# Patient Record
Sex: MALE | Race: WHITE | NOT HISPANIC OR LATINO | Employment: OTHER | URBAN - NONMETROPOLITAN AREA
[De-identification: names, ages, dates, MRNs, and addresses within clinical notes are randomized per-mention and may not be internally consistent; named-entity substitution may affect disease eponyms.]

---

## 2023-03-27 DIAGNOSIS — E78.5 HYPERLIPIDEMIA, UNSPECIFIED HYPERLIPIDEMIA TYPE: Primary | ICD-10-CM

## 2023-03-27 DIAGNOSIS — I10 ESSENTIAL (PRIMARY) HYPERTENSION: ICD-10-CM

## 2023-03-27 RX ORDER — HYDROCHLOROTHIAZIDE 12.5 MG/1
TABLET ORAL
Qty: 90 TABLET | Refills: 3 | Status: ON HOLD | OUTPATIENT
Start: 2023-03-27 | End: 2024-02-08 | Stop reason: SDUPTHER

## 2023-03-27 RX ORDER — ATORVASTATIN CALCIUM 80 MG/1
TABLET, FILM COATED ORAL
Qty: 45 TABLET | Refills: 3 | Status: SHIPPED | OUTPATIENT
Start: 2023-03-27 | End: 2024-05-16 | Stop reason: SDUPTHER

## 2023-03-27 RX ORDER — AMLODIPINE BESYLATE 10 MG/1
10 TABLET ORAL DAILY
COMMUNITY
End: 2023-03-27 | Stop reason: SDUPTHER

## 2023-03-27 RX ORDER — HYDROCHLOROTHIAZIDE 12.5 MG/1
12.5 TABLET ORAL DAILY
COMMUNITY
End: 2023-03-27 | Stop reason: SDUPTHER

## 2023-03-27 RX ORDER — ATORVASTATIN CALCIUM 80 MG/1
80 TABLET, FILM COATED ORAL DAILY
COMMUNITY
End: 2023-03-27 | Stop reason: SDUPTHER

## 2023-03-27 RX ORDER — AMLODIPINE BESYLATE 10 MG/1
TABLET ORAL
Qty: 90 TABLET | Refills: 3 | Status: SHIPPED | OUTPATIENT
Start: 2023-03-27 | End: 2024-04-01

## 2023-04-12 ENCOUNTER — TELEPHONE (OUTPATIENT)
Dept: PRIMARY CARE | Facility: CLINIC | Age: 73
End: 2023-04-12
Payer: MEDICARE

## 2023-04-12 NOTE — TELEPHONE ENCOUNTER
Patient came in wanting a refill on sildenafil Citrate 50 mg  sent to the [facility] on [facility]. Please call when it is sent to the pharmacy.

## 2023-04-13 DIAGNOSIS — N52.9 VASCULOGENIC ERECTILE DYSFUNCTION, UNSPECIFIED VASCULOGENIC ERECTILE DYSFUNCTION TYPE: Primary | ICD-10-CM

## 2023-04-13 RX ORDER — SILDENAFIL 50 MG/1
TABLET, FILM COATED ORAL
Qty: 30 TABLET | Refills: 1 | Status: SHIPPED | OUTPATIENT
Start: 2023-04-13

## 2023-05-05 PROBLEM — R25.9 ABNORMAL INVOLUNTARY MOVEMENT: Status: ACTIVE | Noted: 2023-05-05

## 2023-05-05 PROBLEM — R73.9 ELEVATED BLOOD SUGAR LEVEL: Status: ACTIVE | Noted: 2023-05-05

## 2023-05-05 PROBLEM — I71.019: Status: ACTIVE | Noted: 2023-05-05

## 2023-05-05 PROBLEM — I73.9 CLAUDICATION OF BOTH LOWER EXTREMITIES (CMS-HCC): Status: ACTIVE | Noted: 2023-05-05

## 2023-05-05 PROBLEM — H93.19 TINNITUS: Status: ACTIVE | Noted: 2023-05-05

## 2023-05-05 PROBLEM — M10.9 GOUT: Status: ACTIVE | Noted: 2023-05-05

## 2023-05-05 PROBLEM — T84.84XA: Status: ACTIVE | Noted: 2023-05-05

## 2023-05-05 PROBLEM — G47.31 CENTRAL SLEEP APNEA: Status: ACTIVE | Noted: 2023-05-05

## 2023-05-05 PROBLEM — Z96.619: Status: ACTIVE | Noted: 2023-05-05

## 2023-05-05 PROBLEM — M25.511 RIGHT SHOULDER PAIN: Status: ACTIVE | Noted: 2023-05-05

## 2023-05-05 PROBLEM — D69.6 THROMBOCYTOPENIA (CMS-HCC): Status: ACTIVE | Noted: 2023-05-05

## 2023-05-05 PROBLEM — R25.1 TREMOR OF BOTH HANDS: Status: ACTIVE | Noted: 2023-05-05

## 2023-05-05 PROBLEM — B94.8 SEQUELAE OF OTHER SPECIFIED INFECTIOUS AND PARASITIC DISEASES: Status: ACTIVE | Noted: 2023-05-05

## 2023-05-05 PROBLEM — H91.90 HEARING LOSS: Status: ACTIVE | Noted: 2023-05-05

## 2023-05-05 PROBLEM — R31.29 OTHER MICROSCOPIC HEMATURIA: Status: ACTIVE | Noted: 2023-05-05

## 2023-05-05 PROBLEM — E29.1 HYPOGONADISM, MALE: Status: ACTIVE | Noted: 2023-05-05

## 2023-05-05 PROBLEM — I65.29 CAROTID ARTERY STENOSIS: Status: ACTIVE | Noted: 2023-05-05

## 2023-05-05 PROBLEM — M20.5X1 HALLUX LIMITUS OF RIGHT FOOT: Status: ACTIVE | Noted: 2023-05-05

## 2023-05-05 PROBLEM — G31.84 MILD COGNITIVE IMPAIRMENT: Status: ACTIVE | Noted: 2023-05-05

## 2023-05-05 PROBLEM — N52.9 ERECTILE DYSFUNCTION: Status: ACTIVE | Noted: 2023-05-05

## 2023-05-05 PROBLEM — E55.9 MILD VITAMIN D DEFICIENCY: Status: ACTIVE | Noted: 2023-05-05

## 2023-05-05 PROBLEM — L71.9 ROSACEA: Status: ACTIVE | Noted: 2023-05-05

## 2023-05-05 PROBLEM — M62.08 RECTUS DIASTASIS: Status: ACTIVE | Noted: 2023-05-05

## 2023-05-05 PROBLEM — N28.9 RENAL INSUFFICIENCY: Status: ACTIVE | Noted: 2023-05-05

## 2023-05-05 PROBLEM — R41.89 COGNITIVE CHANGE: Status: ACTIVE | Noted: 2023-05-05

## 2023-05-05 PROBLEM — I20.9 ANGINA PECTORIS (CMS-HCC): Status: ACTIVE | Noted: 2023-05-05

## 2023-05-05 PROBLEM — K21.9 ESOPHAGEAL REFLUX: Status: ACTIVE | Noted: 2023-05-05

## 2023-05-05 PROBLEM — R26.89 ANTALGIC GAIT: Status: ACTIVE | Noted: 2023-05-05

## 2023-05-05 PROBLEM — M54.50 LOW BACK PAIN: Status: ACTIVE | Noted: 2023-05-05

## 2023-05-05 PROBLEM — N02.9 BENIGN ESSENTIAL HEMATURIA: Status: ACTIVE | Noted: 2023-05-05

## 2023-05-05 RX ORDER — MULTIVIT-MIN/IRON/FOLIC ACID/K 18-600-40
CAPSULE ORAL
COMMUNITY

## 2023-05-05 RX ORDER — LANOLIN ALCOHOL/MO/W.PET/CERES
CREAM (GRAM) TOPICAL EVERY OTHER DAY
COMMUNITY

## 2023-05-05 RX ORDER — CITALOPRAM 10 MG/1
10 TABLET ORAL EVERY EVENING
COMMUNITY
Start: 2022-12-01 | End: 2024-01-24 | Stop reason: WASHOUT

## 2023-05-05 RX ORDER — EPINEPHRINE 0.22MG
AEROSOL WITH ADAPTER (ML) INHALATION
COMMUNITY

## 2023-05-05 RX ORDER — CHOLECALCIFEROL (VITAMIN D3) 125 MCG
CAPSULE ORAL
COMMUNITY

## 2023-05-05 RX ORDER — CARVEDILOL 6.25 MG/1
6.25 TABLET ORAL 2 TIMES DAILY
COMMUNITY
End: 2024-05-30 | Stop reason: SDUPTHER

## 2023-05-05 RX ORDER — VALSARTAN 320 MG/1
1 TABLET ORAL DAILY
COMMUNITY
Start: 2020-12-29 | End: 2023-10-12

## 2023-05-05 RX ORDER — ALLOPURINOL 300 MG/1
TABLET ORAL
COMMUNITY
End: 2023-07-05

## 2023-05-09 ENCOUNTER — LAB (OUTPATIENT)
Dept: LAB | Facility: LAB | Age: 73
End: 2023-05-09
Payer: MEDICARE

## 2023-05-09 ENCOUNTER — OFFICE VISIT (OUTPATIENT)
Dept: PRIMARY CARE | Facility: CLINIC | Age: 73
End: 2023-05-09
Payer: MEDICARE

## 2023-05-09 VITALS
BODY MASS INDEX: 37.32 KG/M2 | WEIGHT: 237.8 LBS | HEART RATE: 59 BPM | RESPIRATION RATE: 14 BRPM | TEMPERATURE: 97.7 F | HEIGHT: 67 IN | DIASTOLIC BLOOD PRESSURE: 68 MMHG | SYSTOLIC BLOOD PRESSURE: 122 MMHG | OXYGEN SATURATION: 93 %

## 2023-05-09 DIAGNOSIS — J06.9 UPPER RESPIRATORY TRACT INFECTION, UNSPECIFIED TYPE: ICD-10-CM

## 2023-05-09 DIAGNOSIS — I73.9 CLAUDICATION OF BOTH LOWER EXTREMITIES (CMS-HCC): ICD-10-CM

## 2023-05-09 DIAGNOSIS — I65.29 STENOSIS OF CAROTID ARTERY, UNSPECIFIED LATERALITY: ICD-10-CM

## 2023-05-09 DIAGNOSIS — I71.019 CHRONIC THORACIC AORTIC DISSECTION (MULTI): ICD-10-CM

## 2023-05-09 DIAGNOSIS — E78.2 MIXED HYPERLIPIDEMIA: ICD-10-CM

## 2023-05-09 DIAGNOSIS — I10 PRIMARY HYPERTENSION: ICD-10-CM

## 2023-05-09 DIAGNOSIS — H93.13 TINNITUS OF BOTH EARS: ICD-10-CM

## 2023-05-09 DIAGNOSIS — E78.2 MIXED HYPERLIPIDEMIA: Primary | ICD-10-CM

## 2023-05-09 DIAGNOSIS — Z12.5 PROSTATE CANCER SCREENING: ICD-10-CM

## 2023-05-09 PROCEDURE — 85025 COMPLETE CBC W/AUTO DIFF WBC: CPT

## 2023-05-09 PROCEDURE — 1157F ADVNC CARE PLAN IN RCRD: CPT | Performed by: FAMILY MEDICINE

## 2023-05-09 PROCEDURE — 3074F SYST BP LT 130 MM HG: CPT | Performed by: FAMILY MEDICINE

## 2023-05-09 PROCEDURE — 81003 URINALYSIS AUTO W/O SCOPE: CPT

## 2023-05-09 PROCEDURE — 1160F RVW MEDS BY RX/DR IN RCRD: CPT | Performed by: FAMILY MEDICINE

## 2023-05-09 PROCEDURE — 3078F DIAST BP <80 MM HG: CPT | Performed by: FAMILY MEDICINE

## 2023-05-09 PROCEDURE — 80061 LIPID PANEL: CPT

## 2023-05-09 PROCEDURE — 80053 COMPREHEN METABOLIC PANEL: CPT

## 2023-05-09 PROCEDURE — 1036F TOBACCO NON-USER: CPT | Performed by: FAMILY MEDICINE

## 2023-05-09 PROCEDURE — 36415 COLL VENOUS BLD VENIPUNCTURE: CPT

## 2023-05-09 PROCEDURE — 1159F MED LIST DOCD IN RCRD: CPT | Performed by: FAMILY MEDICINE

## 2023-05-09 PROCEDURE — 84153 ASSAY OF PSA TOTAL: CPT

## 2023-05-09 PROCEDURE — 84443 ASSAY THYROID STIM HORMONE: CPT

## 2023-05-09 PROCEDURE — 99215 OFFICE O/P EST HI 40 MIN: CPT | Performed by: FAMILY MEDICINE

## 2023-05-09 RX ORDER — BENZONATATE 200 MG/1
200 CAPSULE ORAL 3 TIMES DAILY PRN
Qty: 30 CAPSULE | Refills: 0 | Status: SHIPPED | OUTPATIENT
Start: 2023-05-09 | End: 2023-06-08

## 2023-05-09 ASSESSMENT — PAIN SCALES - GENERAL: PAINLEVEL: 0-NO PAIN

## 2023-05-09 ASSESSMENT — PATIENT HEALTH QUESTIONNAIRE - PHQ9
2. FEELING DOWN, DEPRESSED OR HOPELESS: NOT AT ALL
SUM OF ALL RESPONSES TO PHQ9 QUESTIONS 1 AND 2: 0
1. LITTLE INTEREST OR PLEASURE IN DOING THINGS: NOT AT ALL

## 2023-05-09 NOTE — PROGRESS NOTES
"Subjective   Patient ID: Cam Hernandez is a 73 y.o. male who presents for Hyperlipidemia, Hypertension, and Cough (Getting over bronchitis ).    HPI   Loc was seen today for a routine follow-up of his cholesterol, hypertension, vascular medications.  Medication(s) are being taken and tolerated as prescribed, without concerns, list reconciled today.  There are no complaints of chest pain, shortness of breath, lower extremity edema, or exertional concerns    Previous labs reviewed, he is fasting today, due for a PSA.  Vascular care is up-to-date, sees Dr. Dawson this summer, for his vascular disease, history of carotid, thoracic aorta, peripheral vascular disease.  Cam had difficulty losing weight, knows that he has to improve his diet, is also considering the supplement Golo.  Blood pressures have been excellent, no changes have been made in his regimen for some time.      Review of Systems  The full, 10+ multi-organ review of systems, is within normal limits with the exception of what is noted above in HPI.  Objective   /68 (BP Location: Left arm, Patient Position: Sitting, BP Cuff Size: Adult)   Pulse 59   Temp 36.5 °C (97.7 °F) (Temporal)   Resp 14   Ht 1.689 m (5' 6.5\")   Wt 108 kg (237 lb 12.8 oz)   SpO2 93%   BMI 37.81 kg/m²     Physical Exam  Constitutional/General appearance: alert, oriented, well-appearing, in no distress  Head and face exam is normal  No scleral icterus or conjunctival erythema present  Hearing is grossly normal  Respiratory effort is normal, no dyspnea noted  Cortical function is normal  Mood, affect, are pleasant, appropriate, and interactive.  Insight is normal  Cardiac exam reveals a regular rate and rhythm, no rubs or gallops present.   Lungs are clear bilaterally.    No lower extremity edema present.    Assessment/Plan     Hypertension--- since today's blood pressures are at goal, I have recommended continuing the current treatment regimen, including medication as " noted above, as well as a low salt, low-fat, high-fiber diet.  Exercise is to be continued as able and tolerated.  We will continue to follow the high blood pressure on an every six-month basis, and address additional needs should they arise.    Hyperlipidemia--- since lipid panels are/have been stable, I have recommended continuing the current regimen.  This includes a low fat/high-fiber diet, to include foods rich in natural Omega-3's, such as seafood, nuts, and olives, so long as allergies do not prohibit.  Exercise should be continued as able.  Refills were sent as needed.  We will continue followup on an every six-month basis, and will address further needs/issues should they arise.    Vascular disease, continue weight loss efforts, good blood pressure, cholesterol control.    Gout, no symptoms in quite some time, continue allopurinol.    Continue all other medications, specialty follow-up.      Total time spent with patient, reviewing records, and completing charting was 40 minutes, over half of it spent counseling and/or coordinating care  **Portions of this medical record have been created using voice recognition software and may have minor errors which are inherent in voice recognition systems. It has not been fully edited for typographical or grammatical errors**

## 2023-05-10 LAB
ALANINE AMINOTRANSFERASE (SGPT) (U/L) IN SER/PLAS: 16 U/L (ref 10–52)
ALBUMIN (G/DL) IN SER/PLAS: 4.6 G/DL (ref 3.4–5)
ALKALINE PHOSPHATASE (U/L) IN SER/PLAS: 95 U/L (ref 33–136)
ANION GAP IN SER/PLAS: 16 MMOL/L (ref 10–20)
APPEARANCE, URINE: CLEAR
ASPARTATE AMINOTRANSFERASE (SGOT) (U/L) IN SER/PLAS: 21 U/L (ref 9–39)
BASOPHILS (10*3/UL) IN BLOOD BY AUTOMATED COUNT: 0.11 X10E9/L (ref 0–0.1)
BASOPHILS/100 LEUKOCYTES IN BLOOD BY AUTOMATED COUNT: 1.5 % (ref 0–2)
BILIRUBIN TOTAL (MG/DL) IN SER/PLAS: 1.1 MG/DL (ref 0–1.2)
BILIRUBIN, URINE: NEGATIVE
BLOOD, URINE: NEGATIVE
CALCIUM (MG/DL) IN SER/PLAS: 9.3 MG/DL (ref 8.6–10.6)
CARBON DIOXIDE, TOTAL (MMOL/L) IN SER/PLAS: 25 MMOL/L (ref 21–32)
CHLORIDE (MMOL/L) IN SER/PLAS: 105 MMOL/L (ref 98–107)
CHOLESTEROL (MG/DL) IN SER/PLAS: 131 MG/DL (ref 0–199)
CHOLESTEROL IN HDL (MG/DL) IN SER/PLAS: 48.6 MG/DL
CHOLESTEROL/HDL RATIO: 2.7
COLOR, URINE: YELLOW
CREATININE (MG/DL) IN SER/PLAS: 1.14 MG/DL (ref 0.5–1.3)
EOSINOPHILS (10*3/UL) IN BLOOD BY AUTOMATED COUNT: 0.62 X10E9/L (ref 0–0.4)
EOSINOPHILS/100 LEUKOCYTES IN BLOOD BY AUTOMATED COUNT: 8.3 % (ref 0–6)
ERYTHROCYTE DISTRIBUTION WIDTH (RATIO) BY AUTOMATED COUNT: 13.2 % (ref 11.5–14.5)
ERYTHROCYTE MEAN CORPUSCULAR HEMOGLOBIN CONCENTRATION (G/DL) BY AUTOMATED: 33.3 G/DL (ref 32–36)
ERYTHROCYTE MEAN CORPUSCULAR VOLUME (FL) BY AUTOMATED COUNT: 93 FL (ref 80–100)
ERYTHROCYTES (10*6/UL) IN BLOOD BY AUTOMATED COUNT: 4.8 X10E12/L (ref 4.5–5.9)
GFR MALE: 67 ML/MIN/1.73M2
GLUCOSE (MG/DL) IN SER/PLAS: 123 MG/DL (ref 74–99)
GLUCOSE, URINE: NEGATIVE MG/DL
HEMATOCRIT (%) IN BLOOD BY AUTOMATED COUNT: 44.8 % (ref 41–52)
HEMOGLOBIN (G/DL) IN BLOOD: 14.9 G/DL (ref 13.5–17.5)
IMMATURE GRANULOCYTES/100 LEUKOCYTES IN BLOOD BY AUTOMATED COUNT: 0.3 % (ref 0–0.9)
KETONES, URINE: NEGATIVE MG/DL
LDL: 68 MG/DL (ref 0–99)
LEUKOCYTE ESTERASE, URINE: NEGATIVE
LEUKOCYTES (10*3/UL) IN BLOOD BY AUTOMATED COUNT: 7.5 X10E9/L (ref 4.4–11.3)
LYMPHOCYTES (10*3/UL) IN BLOOD BY AUTOMATED COUNT: 1.01 X10E9/L (ref 0.8–3)
LYMPHOCYTES/100 LEUKOCYTES IN BLOOD BY AUTOMATED COUNT: 13.5 % (ref 13–44)
MONOCYTES (10*3/UL) IN BLOOD BY AUTOMATED COUNT: 0.72 X10E9/L (ref 0.05–0.8)
MONOCYTES/100 LEUKOCYTES IN BLOOD BY AUTOMATED COUNT: 9.6 % (ref 2–10)
NEUTROPHILS (10*3/UL) IN BLOOD BY AUTOMATED COUNT: 5.02 X10E9/L (ref 1.6–5.5)
NEUTROPHILS/100 LEUKOCYTES IN BLOOD BY AUTOMATED COUNT: 66.8 % (ref 40–80)
NITRITE, URINE: NEGATIVE
NRBC (PER 100 WBCS) BY AUTOMATED COUNT: 0 /100 WBC (ref 0–0)
PH, URINE: 5 (ref 5–8)
PLATELETS (10*3/UL) IN BLOOD AUTOMATED COUNT: 185 X10E9/L (ref 150–450)
POTASSIUM (MMOL/L) IN SER/PLAS: 3.9 MMOL/L (ref 3.5–5.3)
PROSTATE SPECIFIC ANTIGEN,SCREEN: 1.13 NG/ML (ref 0–4)
PROTEIN TOTAL: 6.9 G/DL (ref 6.4–8.2)
PROTEIN, URINE: NEGATIVE MG/DL
SODIUM (MMOL/L) IN SER/PLAS: 142 MMOL/L (ref 136–145)
SPECIFIC GRAVITY, URINE: 1.02 (ref 1–1.03)
THYROTROPIN (MIU/L) IN SER/PLAS BY DETECTION LIMIT <= 0.05 MIU/L: 1.03 MIU/L (ref 0.44–3.98)
TRIGLYCERIDE (MG/DL) IN SER/PLAS: 72 MG/DL (ref 0–149)
UREA NITROGEN (MG/DL) IN SER/PLAS: 28 MG/DL (ref 6–23)
UROBILINOGEN, URINE: <2 MG/DL (ref 0–1.9)
VLDL: 14 MG/DL (ref 0–40)

## 2023-05-14 NOTE — RESULT ENCOUNTER NOTE
Labs are all excellent, except sugar is above normal, 123.  This is almost to the diabetic level.  He is continue to work on weight loss efforts, diet as discussed, will recheck at next visit.  Would offer a nutrition consultation as well

## 2023-06-09 DIAGNOSIS — R05.3 CHRONIC COUGH: Primary | ICD-10-CM

## 2023-06-13 ENCOUNTER — TELEPHONE (OUTPATIENT)
Dept: PRIMARY CARE | Facility: CLINIC | Age: 73
End: 2023-06-13
Payer: MEDICARE

## 2023-06-13 ENCOUNTER — TELEPHONE (OUTPATIENT)
Dept: PRIMARY CARE | Facility: CLINIC | Age: 73
End: 2023-06-13

## 2023-06-13 DIAGNOSIS — J06.9 UPPER RESPIRATORY TRACT INFECTION, UNSPECIFIED TYPE: Primary | ICD-10-CM

## 2023-06-13 RX ORDER — FLUTICASONE PROPIONATE AND SALMETEROL 250; 50 UG/1; UG/1
1 POWDER RESPIRATORY (INHALATION)
Qty: 60 EACH | Refills: 0 | Status: SHIPPED | OUTPATIENT
Start: 2023-06-13 | End: 2023-11-09 | Stop reason: ALTCHOICE

## 2023-06-13 RX ORDER — BUDESONIDE AND FORMOTEROL FUMARATE DIHYDRATE 160; 4.5 UG/1; UG/1
AEROSOL RESPIRATORY (INHALATION)
Qty: 10.2 G | Refills: 0 | Status: SHIPPED | OUTPATIENT
Start: 2023-06-13 | End: 2023-07-12

## 2023-06-13 NOTE — TELEPHONE ENCOUNTER
Pt informed and verbalized understanding. Pt was wanting to know what he should do about his cough- states that he is still coughing. Was wanting to know if he should continue taking the medication that he was on or if he should try something different.

## 2023-06-13 NOTE — TELEPHONE ENCOUNTER
I sent a prescription for an inhaler, that has 2 ingredients in it.  1 is to open the airways, reduce cough.  The other is a steroid, which is designed to reduce inflammation in the airways.  My hope is that over 2 weeks he notes steady improvement, then can discontinue it.  If not covered by insurance, we will find another

## 2023-06-14 NOTE — TELEPHONE ENCOUNTER
I sent a rx for symbicort, not sure if it will be covered either...if not, ask him to check with pharmacist to see if they can find an alternative that is

## 2023-07-05 DIAGNOSIS — M10.9 GOUT, UNSPECIFIED: ICD-10-CM

## 2023-07-05 RX ORDER — ALLOPURINOL 300 MG/1
TABLET ORAL
Qty: 90 TABLET | Refills: 3 | Status: ON HOLD | OUTPATIENT
Start: 2023-07-05 | End: 2024-02-08 | Stop reason: SDUPTHER

## 2023-07-12 DIAGNOSIS — J06.9 UPPER RESPIRATORY TRACT INFECTION, UNSPECIFIED TYPE: ICD-10-CM

## 2023-07-12 RX ORDER — BUDESONIDE AND FORMOTEROL FUMARATE DIHYDRATE 160; 4.5 UG/1; UG/1
AEROSOL RESPIRATORY (INHALATION)
Qty: 10.2 EACH | Refills: 2 | Status: SHIPPED | OUTPATIENT
Start: 2023-07-12 | End: 2023-11-09 | Stop reason: ALTCHOICE

## 2023-09-22 LAB
ANION GAP IN SER/PLAS: 13 MMOL/L (ref 10–20)
BASOPHILS (10*3/UL) IN BLOOD BY AUTOMATED COUNT: 0.07 X10E9/L (ref 0–0.1)
BASOPHILS/100 LEUKOCYTES IN BLOOD BY AUTOMATED COUNT: 1 % (ref 0–2)
C REACTIVE PROTEIN (MG/L) IN SER/PLAS: 0.21 MG/DL
CALCIUM (MG/DL) IN SER/PLAS: 9.2 MG/DL (ref 8.6–10.3)
CARBON DIOXIDE, TOTAL (MMOL/L) IN SER/PLAS: 27 MMOL/L (ref 21–32)
CHLORIDE (MMOL/L) IN SER/PLAS: 102 MMOL/L (ref 98–107)
CREATININE (MG/DL) IN SER/PLAS: 1.3 MG/DL (ref 0.5–1.3)
EOSINOPHILS (10*3/UL) IN BLOOD BY AUTOMATED COUNT: 0.28 X10E9/L (ref 0–0.4)
EOSINOPHILS/100 LEUKOCYTES IN BLOOD BY AUTOMATED COUNT: 3.8 % (ref 0–6)
ERYTHROCYTE DISTRIBUTION WIDTH (RATIO) BY AUTOMATED COUNT: 12.8 % (ref 11.5–14.5)
ERYTHROCYTE MEAN CORPUSCULAR HEMOGLOBIN CONCENTRATION (G/DL) BY AUTOMATED: 33.8 G/DL (ref 32–36)
ERYTHROCYTE MEAN CORPUSCULAR VOLUME (FL) BY AUTOMATED COUNT: 93 FL (ref 80–100)
ERYTHROCYTES (10*6/UL) IN BLOOD BY AUTOMATED COUNT: 4.84 X10E12/L (ref 4.5–5.9)
GFR MALE: 57 ML/MIN/1.73M2
GLUCOSE (MG/DL) IN SER/PLAS: 82 MG/DL (ref 74–99)
HEMATOCRIT (%) IN BLOOD BY AUTOMATED COUNT: 45.2 % (ref 41–52)
HEMOGLOBIN (G/DL) IN BLOOD: 15.3 G/DL (ref 13.5–17.5)
IMMATURE GRANULOCYTES/100 LEUKOCYTES IN BLOOD BY AUTOMATED COUNT: 0.3 % (ref 0–0.9)
LEUKOCYTES (10*3/UL) IN BLOOD BY AUTOMATED COUNT: 7.3 X10E9/L (ref 4.4–11.3)
LYMPHOCYTES (10*3/UL) IN BLOOD BY AUTOMATED COUNT: 1.18 X10E9/L (ref 0.8–3)
LYMPHOCYTES/100 LEUKOCYTES IN BLOOD BY AUTOMATED COUNT: 16.1 % (ref 13–44)
MONOCYTES (10*3/UL) IN BLOOD BY AUTOMATED COUNT: 0.67 X10E9/L (ref 0.05–0.8)
MONOCYTES/100 LEUKOCYTES IN BLOOD BY AUTOMATED COUNT: 9.1 % (ref 2–10)
NEUTROPHILS (10*3/UL) IN BLOOD BY AUTOMATED COUNT: 5.11 X10E9/L (ref 1.6–5.5)
NEUTROPHILS/100 LEUKOCYTES IN BLOOD BY AUTOMATED COUNT: 69.7 % (ref 40–80)
PLATELETS (10*3/UL) IN BLOOD AUTOMATED COUNT: 186 X10E9/L (ref 150–450)
POTASSIUM (MMOL/L) IN SER/PLAS: 3.8 MMOL/L (ref 3.5–5.3)
SEDIMENTATION RATE, ERYTHROCYTE: 15 MM/H (ref 0–20)
SODIUM (MMOL/L) IN SER/PLAS: 138 MMOL/L (ref 136–145)
UREA NITROGEN (MG/DL) IN SER/PLAS: 22 MG/DL (ref 6–23)

## 2023-09-24 LAB — STAPH/MRSA SCREEN, CULTURE: NORMAL

## 2023-09-27 ENCOUNTER — HOSPITAL ENCOUNTER (INPATIENT)
Dept: DATA CONVERSION | Facility: HOSPITAL | Age: 73
Discharge: HOME | End: 2023-09-28
Attending: ORTHOPAEDIC SURGERY | Admitting: ORTHOPAEDIC SURGERY
Payer: MEDICARE

## 2023-09-27 DIAGNOSIS — I73.9 PERIPHERAL VASCULAR DISEASE, UNSPECIFIED (CMS-HCC): ICD-10-CM

## 2023-09-27 DIAGNOSIS — T84.098A: ICD-10-CM

## 2023-09-27 DIAGNOSIS — J44.9 CHRONIC OBSTRUCTIVE PULMONARY DISEASE, UNSPECIFIED (MULTI): ICD-10-CM

## 2023-09-27 DIAGNOSIS — Z87.891 PERSONAL HISTORY OF NICOTINE DEPENDENCE: ICD-10-CM

## 2023-09-27 DIAGNOSIS — I12.9 HYPERTENSIVE CHRONIC KIDNEY DISEASE WITH STAGE 1 THROUGH STAGE 4 CHRONIC KIDNEY DISEASE, OR UNSPECIFIED CHRONIC KIDNEY DISEASE: ICD-10-CM

## 2023-09-27 DIAGNOSIS — M19.011 PRIMARY OSTEOARTHRITIS, RIGHT SHOULDER: ICD-10-CM

## 2023-09-27 DIAGNOSIS — E66.9 OBESITY, UNSPECIFIED: ICD-10-CM

## 2023-09-27 DIAGNOSIS — I25.10 ATHEROSCLEROTIC HEART DISEASE OF NATIVE CORONARY ARTERY WITHOUT ANGINA PECTORIS: ICD-10-CM

## 2023-09-27 DIAGNOSIS — K21.9 GASTRO-ESOPHAGEAL REFLUX DISEASE WITHOUT ESOPHAGITIS: ICD-10-CM

## 2023-09-27 DIAGNOSIS — Z95.5 PRESENCE OF CORONARY ANGIOPLASTY IMPLANT AND GRAFT: ICD-10-CM

## 2023-09-27 DIAGNOSIS — E78.5 HYPERLIPIDEMIA, UNSPECIFIED: ICD-10-CM

## 2023-09-27 DIAGNOSIS — N18.9 CHRONIC KIDNEY DISEASE, UNSPECIFIED: ICD-10-CM

## 2023-09-28 LAB
ANION GAP IN SER/PLAS: 15 MMOL/L (ref 10–20)
ANION GAP SERPL CALCULATED.3IONS-SCNC: 15 MMOL/L (ref 10–20)
BICARBONATE: 24 MMOL/L (ref 21–32)
CALCIUM (MG/DL) IN SER/PLAS: 7.9 MG/DL (ref 8.6–10.3)
CALCIUM SERPL-MCNC: 7.9 MG/DL (ref 8.6–10.3)
CARBON DIOXIDE, TOTAL (MMOL/L) IN SER/PLAS: 24 MMOL/L (ref 21–32)
CHLORIDE (MMOL/L) IN SER/PLAS: 100 MMOL/L (ref 98–107)
CHLORIDE BLD-SCNC: 100 MMOL/L (ref 98–107)
CREAT SERPL-MCNC: 1.78 MG/DL (ref 0.5–1.3)
CREATININE (MG/DL) IN SER/PLAS: 1.78 MG/DL (ref 0.5–1.3)
EGFR MALE: 39 ML/MIN/1.73M2
ERYTHROCYTE DISTRIBUTION WIDTH (RATIO) BY AUTOMATED COUNT: 13.1 % (ref 11.5–14.5)
ERYTHROCYTE MEAN CORPUSCULAR HEMOGLOBIN CONCENTRATION (G/DL) BY AUTOMATED: 34.3 G/DL (ref 32–36)
ERYTHROCYTE MEAN CORPUSCULAR VOLUME (FL) BY AUTOMATED COUNT: 94 FL (ref 80–100)
ERYTHROCYTE [DISTWIDTH] IN BLOOD BY AUTOMATED COUNT: 13.1 % (ref 11.5–14.5)
ERYTHROCYTES (10*6/UL) IN BLOOD BY AUTOMATED COUNT: 4.14 X10E12/L (ref 4.5–5.9)
GFR MALE: 39 ML/MIN/1.73M2
GLUCOSE (MG/DL) IN SER/PLAS: 136 MG/DL (ref 74–99)
GLUCOSE: 136 MG/DL (ref 74–99)
HCT VFR BLD CALC: 39.1 % (ref 41–52)
HEMATOCRIT (%) IN BLOOD BY AUTOMATED COUNT: 39.1 % (ref 41–52)
HEMOGLOBIN (G/DL) IN BLOOD: 13.4 G/DL (ref 13.5–17.5)
HEMOGLOBIN: 13.4 G/DL (ref 13.5–17.5)
LEUKOCYTES (10*3/UL) IN BLOOD BY AUTOMATED COUNT: 13.5 X10E9/L (ref 4.4–11.3)
MCHC RBC AUTO-ENTMCNC: 34.3 G/DL (ref 32–36)
MCV RBC AUTO: 94 FL (ref 80–100)
PLATELET # BLD: 179 X10E9/L (ref 150–450)
PLATELETS (10*3/UL) IN BLOOD AUTOMATED COUNT: 179 X10E9/L (ref 150–450)
POTASSIUM (MMOL/L) IN SER/PLAS: 3.9 MMOL/L (ref 3.5–5.3)
POTASSIUM SERPL-SCNC: 3.9 MMOL/L (ref 3.5–5.3)
RBC # BLD: 4.14 X10E12/L (ref 4.5–5.9)
SODIUM (MMOL/L) IN SER/PLAS: 135 MMOL/L (ref 136–145)
SODIUM BLD-SCNC: 135 MMOL/L (ref 136–145)
UREA NITROGEN (MG/DL) IN SER/PLAS: 25 MG/DL (ref 6–23)
UREA NITROGEN: 25 MG/DL (ref 6–23)
WBC: 13.5 X10E9/L (ref 4.4–11.3)

## 2023-09-30 NOTE — H&P
History & Physical Reviewed:   I have reviewed the History and Physical dated:  22-Sep-2023   History and Physical reviewed and relevant findings noted. Patient examined to review pertinent physical  findings.: No significant changes   Home Medications Reviewed: no changes noted   Allergies Reviewed: no changes noted       ERAS (Enhanced Recovery After Surgery):  ·  ERAS Patient: no      Consent:   COVID-19 Consent:  ·  COVID-19 Risk Consent Surgeon has reviewed key risks related to the risk of nohemi COVID-19 and if they contract COVID-19 what the risks are.       Electronic Signatures:  Romario Zavala)  (Signed 27-Sep-2023 17:22)   Authored: CINDY, Note Completion   Co-Signer: History & Physical Reviewed, Consent  Ronda Naranjo (APRN-CNP)  (Signed 27-Sep-2023 12:22)   Authored: History & Physical Reviewed, Consent      Last Updated: 27-Sep-2023 17:22 by Romario Zavala)

## 2023-09-30 NOTE — DISCHARGE SUMMARY
Send Summary:   Discharge Summary Providers:  Provider Role Provider Name   · Attending Romario Zavala   · Referring Romario Zavala   · Primary Demetrio Morejon       Note Recipients: Demetrio Morejon MD - [id number]  [Preferred]       Discharge:    Summary:   Admission Date: .27-Sep-2023 11:53:00   Discharge Date: 28-Sep-2023   Attending Physician at Discharge: Dr Zavala   Admission Reason: right shoulder arthritis   Final Discharge Diagnoses: s/p right reverse total  shoulder arthroplasty revision   Procedures: Date: 27-Sep-2023 17:27:00  Procedure Name: 1. Right Shoulder Total Reverse Replacement Revision; Explant Spacer   Vital Signs:        T   P  R  BP   MAP  SpO2   Value  36.6  51  17  107/63      93%  Date/Time 9/28 8:19 9/28 8:19 9/28 8:19 9/28 8:19    9/28 8:19  Range  (36.5C - 36.7C )  (48 - 63 )  (17 - 18 )  (104 - 137 )/ (58 - 71 )    (92% - 94% )   As of 27-Sep-2023 19:50:00, patient is on 4 L/min of oxygen via nasal cannula.    Date:            Weight/Scale Type:  Height:   27-Sep-2023 22:08  100.8  kg         172.4  cm  Physical Exam:    Physical Exam:  Constitutional: Well developed, awake/alert/oriented x3, no distress, alert and cooperative. Sitting up in bed, chatty, NAD  Skin: Warm and dry, no lesions, no rashes  Eyes: PEERLA  ENMT: mucous membranes moist, no apparent injury, no lesions seen  Head/Neck: Neck supple, no apparent injury, trachea midline  Respiratory/Thorax: Patent airways, CTAB, normal breath sounds with good chest expansion, thorax symmetric  Cardiovascular: Regular, rate and rhythm, 2+ equal pulses of the extremities  Gastrointestinal: Non-distended, soft, non-tender, +BS  Genitourinary: voiding without difficulty  Musculoskeletal:  Generalized weakness 2/2 joint replacement  Extremities: no cyanosis edema, contusions or wounds, no clubbing  RUE, neurovascular intact, <2 cap refill,  2+ pulses, no drainage noted. right anterior island dressing c/d/i, ice pack on, s/p  hemovac drain removed.  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 Patient is now s/p right revision reverse shoulder, explant spacer on  9/27 by Dr. Zavala. On the day of surgery, patient was identified in the pre-operative holding area and agreeable to proceed with surgery. Written consent was obtained.  Please see operative note for further details of this procedure. Patient received  24 hours of david-operative antibiotics. Patient recovered in the PACU before transfer to a regular nursing floor. Patient was started on oxycodone, tylenol for pain control and ASA 81 mg daily for DVT prophylaxis. Physical therapy recommended continued  recovery at home with continued physical therapy and wound care. On the day of discharge, patient was afebrile with stable vital signs. Patient was neurovascularly intact at time of discharge. Patient will continue ASA 81 mg for DVT prophylaxis. Patient  will follow-up with Dr. Zavala in 2 weeks for post-operative visit.      Discharge Information:    and Continuing Care:   Lab Results - Pending:    Culture, Miscellaneous, includes Gram Stain Drawn at 27-Sep-2023 17:00:00  Culture, Fungus + Fungus Stain Drawn at 27-Sep-2023 17:00:00  Culture, Miscellaneous, includes Gram Stain Drawn at 27-Sep-2023 17:00:00  Culture, Fungus + Fungus Stain Drawn at 27-Sep-2023 17:00:00  Culture, Miscellaneous, includes Gram Stain Drawn at 27-Sep-2023 17:00:00  Culture, Fungus + Fungus Stain Drawn at 27-Sep-2023 17:00:00  Culture, Miscellaneous, includes Gram Stain Drawn at 27-Sep-2023 17:00:00  Culture, Fungus + Fungus Stain Drawn at 27-Sep-2023 17:00:00  Culture, Miscellaneous, includes Gram Stain Drawn at 27-Sep-2023 17:00:00  Culture, Fungus + Fungus Stain Drawn at 27-Sep-2023 17:00:00  Culture, Miscellaneous, includes Gram  Stain Drawn at 27-Sep-2023 17:00:00  Culture, Fungus + Fungus Stain Drawn at 27-Sep-2023 17:00:00  Radiology Results - Pending: Xray Shoulder 1 View  at 27-Sep-2023 19:38:00   Discharge Instructions:    Activity:           activity with assistance.          May not shower.  until dressing removed 4 days after surgery          May not drive until follow-up visit.            No pushing, pulling, or lifting objects greater than 10 pounds until follow-up visit.  with NONOPERATIVE arms          Weight-bearing Instructions: no weight-bearing right arm.      Nutrition/Diet:           resume normal diet    Wound Care:           Wound Site:   Right shoulder          Wound Type:   surgical incision          Instructions:   no lotions, creams, or tub soaks          Other Instructions:   Leave operative dressing in place until 4 days after surgery. Then remove and leave incision open to air. Let water run freely over incision when showering, do not scrub. Do not soak in pool or tub.    Additional Orders:           Additional Instructions:   The discharge instructions after Total Shoulder Arthroplasty:    ?A sling has been provided for you.  Remove the sling 5 times each day to perform motion exercises.  ?Use ice on the shoulder intermittently for the first 2 weeks after surgery.  ?Pain medication has been prescribed for you.  ?Use your medication liberally over the first 48 hours, and then begin to taper your use.  You may take Extra Strength Tylenol or Tylenol only in place of the pain pills.  DO NOT take ANY nonsteroidal anti-inflammatory pain medications:  Advil,  Motrin, Ibuprofen, Aleve, Naproxen, or Naprosyn.  ?You may remove your dressing after two days if it is dry you can leave open to air.  ?You may shower 4 days after surgery.  The incision CANNOT get wet prior to 4 days.  Simply allow the water to wash over the site and then pat dry.  Do not rub the incision.  Make sure your axilla (armpit) is completely dry after  showering.  ?Continue to take aspirin 81 mg once a day for blood clot prevention.  If Coumadin, Warfarin or another blood thinner is prescribed for blood clot prevention, take this medication as directed by your medical clearance physician.  DO NOT TAKE ANOTHER  BLOOD THINNER AND ASPIRIN.  ?Active reaching and lifting are not permitted.  You may use the operative arm for activities of daily living that do not require the operative arm to leave the side of the body, such as eating, drinking, bathing, etc.  ?3 to 5 times each day you should perform assisted overhead reaching and external rotation (outward turning) exercises with the operative arm.  You were taught these exercises prior to discharge.  Both exercises should be done with the non-operative  arm used as the ?therapist arm? while the operative arm remains relaxed.  Ten of each exercise should be done three to five times each day.    Overhead reach is helping to lift your stiff arm up as high as it will go.  To stretch your overhead reach, lie flat on your back, relax, and grasp the wrist of the tight shoulder with your opposite hand.  Using the power in your opposite arm, bring the  stiff arm up as far as it is comfortable.  Start holding it for ten seconds and then work up to where you can hold it for a count of 30.  Breathe slowly and deeply while the arm is moved.  Repeat this stretch ten times, trying to help the arm up a little  higher each time.    External rotation is turning the arm out to the side while your elbow stays close to your body.  External rotation is best stretched while you are lying on your back.  Hold a cane, yardstick, broom handle, or dowel in both hands.  Bend both elbows to  a right angle.  Use steady, gentle force from your normal arm to rotate the hand of the stiff shoulder out away from your body.  Continue the rotation as far as it will go comfortably, holding it there for a count of 10.  Repeat this exercise ten  times.    Please call [phone] for any problems.    Including the following:  ?excessive redness of the Incisions  ?drainage for more than 4 days  ?fever of more than 101.5 F    Please call [phone] to make a follow-up appointment if one has not already been made. You should see the doctor 10-14 days after your surgery..    Discharge Medications: Home Medication   hydrochlorothiazide 25 mg oral tablet - 1 tab(s) orally once a day -     carvedilol 6.25 mg oral tablet - 1 tab(s) orally 2 times a day -     atorvastatin 80 mg oral tablet - 0.5 tab(s) orally once a day (at bedtime)  aspirin 81 mg oral tablet - 1 tab(s) orally once a day  valsartan 320 mg oral tablet - 1 tab(s) orally once a day  Turmeric - 1500 milligram(s) orally once a day  Colace 100 mg oral capsule - 1 cap(s) orally 2 times a day   oxycodone-acetaminophen 5 mg-325 mg oral tablet - 1 tab(s) orally every 6 hours   ondansetron 4 mg oral tablet, disintegrating - 1 tab(s) orally every 6 hours   allopurinol 300 mg oral tablet - 1 tab(s) orally Monday, Wednesday, and Friday  amLODIPine 10 mg oral tablet - 1 tab(s) orally once a day  CoQ10 100 mg oral capsule - 1 cap(s) orally once a day     PRN Medication   sildenafil 50 mg oral tablet - 1-2 tab(s) orally once a day     DNR Status:   ·  Code Status Code Status order at time of discharge: Full Code       Electronic Signatures:  Denisha Menendez (APRN-CNP)  (Signed 28-Sep-2023 10:33)   Authored: Send Summary, Summary Content, Ongoing Care,  DNR Status, Note Completion      Last Updated: 28-Sep-2023 10:33 by Denisha Menendez (APRN-CNP)

## 2023-10-01 NOTE — OP NOTE
PROCEDURE DETAILS    Preoperative Diagnosis:  Osteoarthritis of right shoulder, M19.011    Postoperative Diagnosis:  spacer placement, pain of shoulder, bone loss defect  Surgeon: Romario Zavala  Resident/Fellow/Other Assistant: Ronda Naranjo    Procedure:  1. Right Shoulder Total Reverse Replacement Revision; Explant Spacer    Anesthesia: Enrrique Flor  Estimated Blood Loss: 100  Findings: Consistent with diagnosis patient had significant bone loss in the humerus and glenoid secondary to the revision surgery as well as his past prosthesis  Specimens(s) Collected: yes,  Cultures         Operative Report:   This is a pleasant gentleman who had an anatomic shoulder placement that was removed by me in 2022.  At that time he had significant bone loss on the glenoid  side that could not be appreciated with preoperative CT scans.  He also had an osteotomy of his humerus when removing the implant which led to the decision that he would need custom reverse shoulder replacement.  This was planned with CT scan.  Patient  delayed his surgery secondary to not wanting to have the surgery during the summertime.  Repeat CT scan revealed no further deformity.  The custom glenoid and the humeral replacing prosthesis were used.  Restore 3D was the plan and Equanox HRP    Implants 36 mm +3 glenosphere with a standard custom implant glenosphere and baseplate.  11 mm stem 25.5 collar large proximal body +12.5.  +5 tray with constrained liner    Operative report date of surgery patient was seen in preop holding identifies correct patient right shoulder identified marked correct operative site risk-benefit alternatives of surgery discussed at length including risk infection bleeding nerve injury  no improvement in pain dislocation.  He understood wished to proceed.  He was in the operating room center [facility].  A huddle was performed.  Antibiotics were dosed trans-Otilio acid was given.  Intubated and sedated by anesthesia.   Position 60  degree beachchair position all bony prominences were well-padded head was held in neutral position by a [employer] head alvarez.  Prepped and draped in standard fashion.  We made a standard deltopectoral incision.  We carefully took this down the deltopectoral  and released the deltoid over top of the humerus.  We also did a medial soft tissue release.  He had significant amount of scar tissue.  This was very difficult increased complexity of the procedure with the approach.  His greater tuberosity had fractured  off and result this also increased the complexity as this had to be excised to use the humeral replacing implant.  This was done in standard fashion subperiosteally.  The spacer was removed.  The proximal bone that was very poor quality was removed.   We then turned our attention to the glenoid.  Significant retroversion medialization had occurred.  We removed all the soft tissue.  We used our custom guide.  We placed the pin.  The 7 mm drill was then used for the center post.  This went right into  the scalp base to the scapular spine.  We used a anterior compression screw and then locking screws to complete our fixation.  36+3 was trialed and eventually was used and impacted in standard fashion.  Turned our attention back to the humerus.  The cut  was made and we reamed up in standard fashion.  I felt 11 it was very nice fixation.  Cement restrictor was placed we trialed the collar as well as that stem.  We placed a stem and collar in standard fashion.  We then trialed again to ensure that we were  in good position.  I felt the intercalary segments gave us too much length.  As result I told shows a large by 12.5 we fixed this in 20 degrees retroversion.  We trialed again to ensure we have the proper tray and liner.  We reduced the shoulder in standard  fashion and was very stable with no signs of any significant impingement.  Come up to 120 degrees internal/external rotation no instability  anteriorly with the posterior shock.  Copiously irrigated the wound placed a deep drain and closed the deltopectoral  interval and nonabsorbable suture.  I will be billing for increased complexity is greater than 60 minutes were added to the case because of the complex soft tissue and fragmentation as well as the need for a custom implant.  Because of bone loss.  Standard  revision closure was performed  Please note that we will be billing for my nurse practitioner's first assist as she was critical in the center for successful completion of the  case including positioning of the body, retraction, suture management, placement of the implants and wound closure. There was no qualified resident available for the entire case                        Attestation:   Note Completion:  Attending Attestation I was present for key portions of the procedure and the procedure lasted longer than 5 minutes.         Electronic Signatures:  Romario Zavala)  (Signed 27-Sep-2023 17:33)   Authored: Post-Operative Note, Chart Review, Note Completion      Last Updated: 27-Sep-2023 17:33 by Romario Zavala)

## 2023-10-04 LAB
GRAM STAIN: NORMAL
TISSUE/WOUND CULTURE/SMEAR: NORMAL

## 2023-10-12 DIAGNOSIS — I10 ESSENTIAL (PRIMARY) HYPERTENSION: ICD-10-CM

## 2023-10-12 RX ORDER — VALSARTAN 320 MG/1
320 TABLET ORAL DAILY
Qty: 90 TABLET | Refills: 3 | Status: SHIPPED | OUTPATIENT
Start: 2023-10-12

## 2023-10-13 ENCOUNTER — HOSPITAL ENCOUNTER (OUTPATIENT)
Dept: RADIOLOGY | Facility: HOSPITAL | Age: 73
Discharge: HOME | End: 2023-10-13
Payer: MEDICARE

## 2023-10-13 ENCOUNTER — OFFICE VISIT (OUTPATIENT)
Dept: ORTHOPEDIC SURGERY | Facility: HOSPITAL | Age: 73
End: 2023-10-13
Payer: MEDICARE

## 2023-10-13 VITALS — BODY MASS INDEX: 34.37 KG/M2 | WEIGHT: 219 LBS | HEIGHT: 67 IN

## 2023-10-13 DIAGNOSIS — T49.95XA SKIN IRRITATION DUE TO TOPICAL AGENT: ICD-10-CM

## 2023-10-13 DIAGNOSIS — M25.511 RIGHT SHOULDER PAIN, UNSPECIFIED CHRONICITY: ICD-10-CM

## 2023-10-13 DIAGNOSIS — M25.511 RIGHT SHOULDER PAIN, UNSPECIFIED CHRONICITY: Primary | ICD-10-CM

## 2023-10-13 DIAGNOSIS — R23.8 SKIN IRRITATION DUE TO TOPICAL AGENT: ICD-10-CM

## 2023-10-13 PROCEDURE — 73030 X-RAY EXAM OF SHOULDER: CPT | Mod: RIGHT SIDE | Performed by: RADIOLOGY

## 2023-10-13 PROCEDURE — 1126F AMNT PAIN NOTED NONE PRSNT: CPT | Performed by: NURSE PRACTITIONER

## 2023-10-13 PROCEDURE — 99024 POSTOP FOLLOW-UP VISIT: CPT | Performed by: NURSE PRACTITIONER

## 2023-10-13 PROCEDURE — 1159F MED LIST DOCD IN RCRD: CPT | Performed by: NURSE PRACTITIONER

## 2023-10-13 PROCEDURE — 73030 X-RAY EXAM OF SHOULDER: CPT | Mod: RT

## 2023-10-13 PROCEDURE — 1036F TOBACCO NON-USER: CPT | Performed by: NURSE PRACTITIONER

## 2023-10-13 PROCEDURE — 3008F BODY MASS INDEX DOCD: CPT | Performed by: NURSE PRACTITIONER

## 2023-10-13 PROCEDURE — 1160F RVW MEDS BY RX/DR IN RCRD: CPT | Performed by: NURSE PRACTITIONER

## 2023-10-13 RX ORDER — SULFAMETHOXAZOLE AND TRIMETHOPRIM 800; 160 MG/1; MG/1
1 TABLET ORAL 2 TIMES DAILY
Qty: 14 TABLET | Refills: 0 | Status: SHIPPED | OUTPATIENT
Start: 2023-10-13 | End: 2023-10-20

## 2023-10-13 ASSESSMENT — PAIN DESCRIPTION - DESCRIPTORS: DESCRIPTORS: DULL

## 2023-10-13 ASSESSMENT — PAIN SCALES - GENERAL: PAINLEVEL_OUTOF10: 3

## 2023-10-13 ASSESSMENT — PAIN - FUNCTIONAL ASSESSMENT: PAIN_FUNCTIONAL_ASSESSMENT: 0-10

## 2023-10-13 NOTE — PROGRESS NOTES
Provider Impression/Assessment:  Cam Hernandez is a pleasant 73 y.o. male who is accompanied by his wife today. Returning to clinic for his first postoperative visit. He is status post right shoulder reverse total shoulder replacement revision, done 09/28/23. Final results of intraoperative cultures came back as negative, showing no growth aerobically or anaerobically. Results discussed with patient today.     Patient Discussion/Plan:  He is doing well since surgery. He will continue to wear his external sling at this time for support. For the next 6 weeks, no lifting more than a coffee cup. Keep your hand in front of you at all times. No opening or closing doors. We emphasized scapular stabilizers and they are instructed on how to do them. Continue doing active range of motion and passive range of motion of elbow, wrist and hand for the next week. No passive lifting of the shoulder yet.     He should not visit the Dentist for 3 months after shoulder replacement, unless an emergency. Patient understands the need for lifelong prophylactic oral antibiotic prior to dental work in the future. They understand that their Dentist or our team can prescribe the antibiotics for them in the future.    Patient unfortunately appears to be having an adverse reaction to the adhesive glue over the surgical incision. We have started them on oral antibiotics at this time. We will see them back next week for wound check. They know to call our office immediately if any changes to incision prior to his scheduled follow up. We will leave the suture in place until the incision heals better. They will keep the wound dry with dressing over it to protect.     We have scheduled to have him see Dr Zavala next week for a repeat clinical check and repeat xrays at that time.     All questions were answered today to their satisfaction and they know how to reach our office if needed prior to the  next scheduled visit with our office.       Patient was discussed with Dr Zavala and he agrees with the above plan.    Should you have any questions or concerns after your visit today, please do not hesitate to call the office at [phone]. I am honored to be a provider on your health care team and remain dedicated to helping you achieve your healthcare goals    -------------------------------------------------------------------------------------------------  CHIEF COMPLAINT:  POV REVERSE REPLACEMENT, REVISION  DOS: 09/28/23.    History of Present Illness:  Cam Hernandez reports doing well after surgery. Pain is well managed. Denies use of narcotic pain medication at this time. Over-the-counter pain medication at this time as needed. Using ice machine daily. Sleeping with some difficulty. Denies redness or warmth at incision site. Denies any fever, chills, or paresthesia. They have been compliant with restrictions. Presents today wearing their external shoulder sling. Doing well with daily home therapy for active elbow, wrist and hand exercises. New x-rays obtained today. He states that he is doing well, overall. He has discontinued pain medications. Taking Tylenol, as needed. He reports some swelling through the right shoulder, along with some redness over the incision.     Review of Systems:   Review of systems for all 14 systems is negative for complaint except for the above noted findings in the history of present illness.    Family, social, and medical histories are obtained and reviewed.    Allergies:  No Known Allergies    Medications:    Current Outpatient Medications:     allopurinol (Zyloprim) 300 mg tablet, TAKE 1 TABLET BY MOUTH DAILY FOR GOUT PREVENTION, Disp: 90 tablet, Rfl: 3    amLODIPine (Norvasc) 10 mg tablet, TAKE 1 TABLET BY MOUTH EVERY DAY FOR BLOOD PRESSURE, Disp: 90 tablet, Rfl: 3    ascorbic acid, vitamin C, 500 mg capsule, Take by mouth., Disp: , Rfl:     aspirin 81 mg capsule, Take 1 tablet by mouth once daily., Disp: ,  Rfl:     atorvastatin (Lipitor) 80 mg tablet, TAKE 1/2 TABLET ONCE DAILY, Disp: 45 tablet, Rfl: 3    budesonide-formoteroL (Symbicort) 160-4.5 mcg/actuation inhaler, INHALE 2 PUFFS TWICE DAILY, RINSE MOUTH WITH WATER AFTER USE, Disp: 10.2 each, Rfl: 2    carvedilol (Coreg) 6.25 mg tablet, Take 1 tablet (6.25 mg) by mouth. Take with food., Disp: , Rfl:     cholecalciferol (Vitamin D-3) 125 MCG (5000 UT) capsule, Take by mouth., Disp: , Rfl:     citalopram (CeleXA) 10 mg tablet, Take 1 tablet (10 mg) by mouth once daily in the evening., Disp: , Rfl:     coenzyme Q-10 100 mg capsule, Take by mouth., Disp: , Rfl:     cyanocobalamin (Vitamin B-12) 1,000 mcg tablet, Take by mouth every other day., Disp: , Rfl:     docusate sodium (Colace) 100 mg capsule, TAKE 1 CAPSULE BY MOUTH TWO TIMES A DAY, Disp: 30 capsule, Rfl: 0    fluticasone propion-salmeteroL (Advair Diskus) 250-50 mcg/dose diskus inhaler, Inhale 1 puff 2 times a day. Rinse mouth with water after use, Disp: 60 each, Rfl: 0    hydroCHLOROthiazide (HYDRODiuril) 12.5 mg tablet, TAKE 1 TABLET BY MOUTH EVERY DAY, Disp: 90 tablet, Rfl: 3    ondansetron ODT (Zofran-ODT) 4 mg disintegrating tablet, DISSOLVE 1 TABLET IN MOUTH EVERY 6 HOURS, Disp: 28 tablet, Rfl: 0    oxyCODONE-acetaminophen (Percocet) 5-325 mg tablet, TAKE 1 TABLET BY MOUTH EVERY 6 HOURS (Patient not taking: Reported on 10/13/2023), Disp: 28 tablet, Rfl: 0    sildenafil (Viagra) 50 mg tablet, Take 1 tablet daily as needed/directed.  Good Rx please, Disp: 30 tablet, Rfl: 1    valsartan (Diovan) 320 mg tablet, TAKE 1 TABLET BY MOUTH EVERY DAY, Disp: 90 tablet, Rfl: 3    ZINC ORAL, Take 1 tablet by mouth once daily., Disp: , Rfl:     Diagnoses/Problems:  Shoulder arthritis, right    Physical Examination:  Patient shoulder incision is dry, intact and healing well. Mild swelling. Mild ecchymosis. No erythema or warmth. Nonabsorbable suture remains. Surgical glue intact. Neurovascularly intact distally.  Axillary nerve appears to be firing. 5 out of 5 wrist, hand and thumb flexion and extension without pain. Full range of motion of elbow.     Results/Imaging:  Independent review of the imaging today of right shoulder show good alignment status post reverse total shoulder replacement. No evidence of fracture or dislocation or other acute findings. I personally spent time viewing digital images of the radiology testing with the patient. Radiology results discussed with Dr. Zavala. No axillary view today.     *While intending to generate a timely document that accurately reflects the content of the visit, no guarantee can be provided that every grammatical or spelling mistake has been or will be identified or corrected. Thank you for your understanding.

## 2023-10-16 LAB
FUNGAL CULTURE/SMEAR: NORMAL
FUNGAL SMEAR: NORMAL

## 2023-10-19 ENCOUNTER — OFFICE VISIT (OUTPATIENT)
Dept: ORTHOPEDIC SURGERY | Facility: HOSPITAL | Age: 73
End: 2023-10-19
Payer: MEDICARE

## 2023-10-19 ENCOUNTER — HOSPITAL ENCOUNTER (OUTPATIENT)
Dept: RADIOLOGY | Facility: HOSPITAL | Age: 73
Discharge: HOME | End: 2023-10-19
Payer: MEDICARE

## 2023-10-19 ENCOUNTER — APPOINTMENT (OUTPATIENT)
Dept: ORTHOPEDIC SURGERY | Facility: HOSPITAL | Age: 73
End: 2023-10-19
Payer: MEDICARE

## 2023-10-19 DIAGNOSIS — M25.511 RIGHT SHOULDER PAIN, UNSPECIFIED CHRONICITY: ICD-10-CM

## 2023-10-19 DIAGNOSIS — M25.511 RIGHT SHOULDER PAIN, UNSPECIFIED CHRONICITY: Primary | ICD-10-CM

## 2023-10-19 PROCEDURE — 99024 POSTOP FOLLOW-UP VISIT: CPT | Performed by: ORTHOPAEDIC SURGERY

## 2023-10-19 PROCEDURE — 73030 X-RAY EXAM OF SHOULDER: CPT | Mod: RIGHT SIDE | Performed by: RADIOLOGY

## 2023-10-19 PROCEDURE — 1159F MED LIST DOCD IN RCRD: CPT | Performed by: ORTHOPAEDIC SURGERY

## 2023-10-19 PROCEDURE — 1036F TOBACCO NON-USER: CPT | Performed by: ORTHOPAEDIC SURGERY

## 2023-10-19 PROCEDURE — 3008F BODY MASS INDEX DOCD: CPT | Performed by: ORTHOPAEDIC SURGERY

## 2023-10-19 PROCEDURE — 73030 X-RAY EXAM OF SHOULDER: CPT | Mod: RT

## 2023-10-19 PROCEDURE — 1160F RVW MEDS BY RX/DR IN RCRD: CPT | Performed by: ORTHOPAEDIC SURGERY

## 2023-10-19 PROCEDURE — 1126F AMNT PAIN NOTED NONE PRSNT: CPT | Performed by: ORTHOPAEDIC SURGERY

## 2023-10-19 NOTE — PROGRESS NOTES
Subjective    Patient ID: Cam Hernandez is a 73 y.o. male.    Chief Complaint: No chief complaint on file.     Last Surgery: 1. Right Shoulder Total Reverse Replacement Revision; Explant Spacer   Last Surgery Date: 09/27/23    CAM HERNANDEZ is a pleasant 72 year old male who is accompanied by his wife today. He returns to clinic for a repeat postoperative visit. He is status post right anatomic shoulder revision to spacer placement on 10/5/22 for loosening implant and painful shoulder. He was found to have significant bone loss of glenoid at that time. He has completed a repeat CT scan for pre-operative revision planning.     He reports doing well overall after surgery. He is having some pain in the joint space with active movement, likely due to the spacer. Pain is well managed without use of narcotic pain medication at this time. Over-the-counter pain medication as needed. He is sleeping okay. He denies redness or warmth at incision site. No drainage. Denies any fever, chills, or paresthesia. He has been compliant with restrictions. Doing well with daily home therapy.     10/19/23  Cam returns to the clinic today for his first post operative visit after a right shoulder total reverse replacement revision with explant spacer on 09/27/23.    He explains he has been wearing a sling. He is quite sensitive still. He had some questions regarding what he can and cannot do activity wise today.         Objective       Image Results:  XR shoulder right 2+ views  Interpreted By:  August Kate,   STUDY:  XR SHOULDER RIGHT 2+ VIEWS;  10/13/2023 10:02 am      INDICATION:  Signs/Symptoms:pain.      COMPARISON:  09/27/2023      ACCESSION NUMBER(S):  [id number]      ORDERING CLINICIAN:  ANTONINA BATISTA      FINDINGS:  Status post revision reverse total shoulder arthroplasty without  evidence of hardware failure. Moderate productive degenerative  changes of the acromioclavicular joint. Soft tissues intact.  Increased  separation between the humeral and glenoid component of the  shoulder arthroplasty.      IMPRESSION  Changes status post revision right reverse shoulder arthroplasty.  There is increased distance of the joint space which may represent  either subluxation of the humeral component and or a right  glenohumeral joint effusion. Clinical correlation advised. Otherwise  no evidence of hardware failure.          MACRO  none      Signed by: August Kate 10/14/2023 12:16 PM  Dictation workstation:   [id number]  10/19/23 X-rays ordered and personally interpreted by me show some evidence of subluxation    Assessment/Plan   Encounter Diagnoses:  Right shoulder pain, unspecified chronicity  Patient is 3 weeks s/p right shoulder total reverse replacement revision with explant spacer     He continues to have a small amount of subluxation on a few views of his x-rays. He is currently not dislocating. I think we need to give this time to see if nerves begin firing and motor function of his muscles can help strengthen this. He and his wife understand that there is a chance of another surgery to stabilize his shoulder. 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 He understands that reaching overhead is unlikely at this time and we emphasized that today. We will see him back in 2 weeks with repeat x-rays at that time. If there is evidence of movement we will discuss what our next step will be.   Orders Placed This Encounter    XR shoulder right 2+ views     Follow up in 2 weeks    Scribe Attestation  By signing my name below, IVioletta Scribe   attest that this documentation has been prepared under the direction and in the presence of Romario Zavala MD.

## 2023-10-27 ENCOUNTER — ANCILLARY PROCEDURE (OUTPATIENT)
Dept: RADIOLOGY | Facility: CLINIC | Age: 73
End: 2023-10-27
Payer: MEDICARE

## 2023-10-27 DIAGNOSIS — M25.511 RIGHT SHOULDER PAIN, UNSPECIFIED CHRONICITY: ICD-10-CM

## 2023-10-27 PROCEDURE — 73030 X-RAY EXAM OF SHOULDER: CPT | Mod: RT,FY

## 2023-10-27 PROCEDURE — 73030 X-RAY EXAM OF SHOULDER: CPT | Mod: RIGHT SIDE | Performed by: RADIOLOGY

## 2023-10-28 PROBLEM — T49.95XA SKIN IRRITATION DUE TO TOPICAL AGENT: Status: ACTIVE | Noted: 2023-10-28

## 2023-10-28 PROBLEM — R23.8 SKIN IRRITATION DUE TO TOPICAL AGENT: Status: ACTIVE | Noted: 2023-10-28

## 2023-10-31 ENCOUNTER — APPOINTMENT (OUTPATIENT)
Dept: RADIOLOGY | Facility: HOSPITAL | Age: 73
End: 2023-10-31
Payer: MEDICARE

## 2023-10-31 ENCOUNTER — OFFICE VISIT (OUTPATIENT)
Dept: ORTHOPEDIC SURGERY | Facility: HOSPITAL | Age: 73
End: 2023-10-31
Payer: MEDICARE

## 2023-10-31 DIAGNOSIS — M25.511 RIGHT SHOULDER PAIN, UNSPECIFIED CHRONICITY: Primary | ICD-10-CM

## 2023-10-31 PROCEDURE — 3008F BODY MASS INDEX DOCD: CPT | Performed by: ORTHOPAEDIC SURGERY

## 2023-10-31 PROCEDURE — 1126F AMNT PAIN NOTED NONE PRSNT: CPT | Performed by: ORTHOPAEDIC SURGERY

## 2023-10-31 PROCEDURE — 3078F DIAST BP <80 MM HG: CPT | Performed by: ORTHOPAEDIC SURGERY

## 2023-10-31 PROCEDURE — 3074F SYST BP LT 130 MM HG: CPT | Performed by: ORTHOPAEDIC SURGERY

## 2023-10-31 PROCEDURE — 1159F MED LIST DOCD IN RCRD: CPT | Performed by: ORTHOPAEDIC SURGERY

## 2023-10-31 PROCEDURE — 1036F TOBACCO NON-USER: CPT | Performed by: ORTHOPAEDIC SURGERY

## 2023-10-31 PROCEDURE — 1160F RVW MEDS BY RX/DR IN RCRD: CPT | Performed by: ORTHOPAEDIC SURGERY

## 2023-10-31 PROCEDURE — 99024 POSTOP FOLLOW-UP VISIT: CPT | Performed by: ORTHOPAEDIC SURGERY

## 2023-10-31 ASSESSMENT — PAIN SCALES - GENERAL: PAINLEVEL_OUTOF10: 3

## 2023-10-31 ASSESSMENT — PAIN - FUNCTIONAL ASSESSMENT: PAIN_FUNCTIONAL_ASSESSMENT: 0-10

## 2023-10-31 NOTE — H&P (VIEW-ONLY)
Subjective    Patient ID: Cam Hernandez is a 73 y.o. male.    Chief Complaint: Post-op of the Right Shoulder     Last Surgery: 1. Right Shoulder Total Reverse Replacement Revision; Explant Spacer   Last Surgery Date: 09/27/23    CAM HERNANDEZ is a pleasant 72 year old male who is accompanied by his wife today. He returns to clinic for a repeat postoperative visit. He is status post right anatomic shoulder revision to spacer placement on 10/5/22 for loosening implant and painful shoulder. He was found to have significant bone loss of glenoid at that time. He has completed a repeat CT scan for pre-operative revision planning.     He reports doing well overall after surgery. He is having some pain in the joint space with active movement, likely due to the spacer. Pain is well managed without use of narcotic pain medication at this time. Over-the-counter pain medication as needed. He is sleeping okay. He denies redness or warmth at incision site. No drainage. Denies any fever, chills, or paresthesia. He has been compliant with restrictions. Doing well with daily home therapy.     10/19/23  Cam returns to the clinic today for his first post operative visit. He is s/p right anatomic shoulder revision to spacer placement on 10/5/22 for loosening implant and painful shoulder.    He explains he has been wearing a sling. He is quite sensitive still. He had some questions regarding what he can and cannot do activity wise today.     10/31/23  Cam returns to the clinic today for his second post operative visit. He is s/p right anatomic shoulder revision to spacer placement on 10/5/22 for loosening implant and painful shoulder.            Objective   Patient shoulder incision is dry, intact and healing well. Mild swelling. Mild ecchymosis. No erythema or warmth. Nonabsorbable suture remains. Surgical glue intact. Neurovascularly intact distally. Axillary nerve appears to be firing. 5 out of 5 wrist, hand and thumb flexion  and extension without pain. Full range of motion of elbow.     Image Results:  XR shoulder right 2+ views  Narrative: Interpreted By:  Eric Chaudhry,   STUDY:  XR SHOULDER RIGHT 2+ VIEWS      INDICATION:  Signs/Symptoms:pain.      COMPARISON:  October 19, 2023      ACCESSION NUMBER(S):  [id number]      ORDERING CLINICIAN:  CAMILLE ACUÑA      FINDINGS:  Proximal humeral resection with reverse total shoulder arthroplasty  again noted. There is increased spacing and change in alignment  between the humeral and glenoid component raising concern for a  component of subluxation/dislocation      Impression: Proximal humeral resection with reverse total shoulder arthroplasty  again noted. There is increased spacing and change in alignment  between the humeral and glenoid component raising concern for a  component of subluxation/dislocation      Signed by: Eric Chaudhry 10/28/2023 2:36 PM  Dictation workstation:   [id number]  10/19/23 X-rays ordered and personally interpreted by me show some evidence of subluxation    Assessment/Plan   Encounter Diagnoses:  Right shoulder pain, unspecified chronicity  Patient is 5+ weeks s/p right shoulder total reverse replacement revision with explant spacer     He continues to have a small amount of subluxation on a few views of his x-rays. He is currently not dislocating. I think we need to continue to give this time to see if nerves begin firing and motor function of his muscles can help strengthen this. He and his wife understand that there is still a chance of another revision surgery to stabilize his shoulder. He may wear his sling for comfort but he should come out to work on elbow and wrist range of motion. He will work on light stretching and pendulum swings for his arm for the next week. We will see him back in 10 days with repeat x-rays at that time. We will discuss what our plan forward is at that time once we have seen his x-rays.  Orders Placed This Encounter    XR  shoulder right 2+ views     Follow up in 10 days with repeat x-rays.    Scribe Attestation  By signing my name below, I, Clarisse Mcintyre   attest that this documentation has been prepared under the direction and in the presence of Romario Zavala MD.

## 2023-10-31 NOTE — PROGRESS NOTES
Subjective    Patient ID: Cam Hernandez is a 73 y.o. male.    Chief Complaint: Post-op of the Right Shoulder     Last Surgery: 1. Right Shoulder Total Reverse Replacement Revision; Explant Spacer   Last Surgery Date: 09/27/23    CAM HERNANDEZ is a pleasant 72 year old male who is accompanied by his wife today. He returns to clinic for a repeat postoperative visit. He is status post right anatomic shoulder revision to spacer placement on 10/5/22 for loosening implant and painful shoulder. He was found to have significant bone loss of glenoid at that time. He has completed a repeat CT scan for pre-operative revision planning.     He reports doing well overall after surgery. He is having some pain in the joint space with active movement, likely due to the spacer. Pain is well managed without use of narcotic pain medication at this time. Over-the-counter pain medication as needed. He is sleeping okay. He denies redness or warmth at incision site. No drainage. Denies any fever, chills, or paresthesia. He has been compliant with restrictions. Doing well with daily home therapy.     10/19/23  Cam returns to the clinic today for his first post operative visit. He is s/p right anatomic shoulder revision to spacer placement on 10/5/22 for loosening implant and painful shoulder.    He explains he has been wearing a sling. He is quite sensitive still. He had some questions regarding what he can and cannot do activity wise today.     10/31/23  Cam returns to the clinic today for his second post operative visit. He is s/p right anatomic shoulder revision to spacer placement on 10/5/22 for loosening implant and painful shoulder.            Objective   Patient shoulder incision is dry, intact and healing well. Mild swelling. Mild ecchymosis. No erythema or warmth. Nonabsorbable suture remains. Surgical glue intact. Neurovascularly intact distally. Axillary nerve appears to be firing. 5 out of 5 wrist, hand and thumb flexion  and extension without pain. Full range of motion of elbow.     Image Results:  XR shoulder right 2+ views  Narrative: Interpreted By:  Eric Chaudhry,   STUDY:  XR SHOULDER RIGHT 2+ VIEWS      INDICATION:  Signs/Symptoms:pain.      COMPARISON:  October 19, 2023      ACCESSION NUMBER(S):  [id number]      ORDERING CLINICIAN:  CAMILLE ACUÑA      FINDINGS:  Proximal humeral resection with reverse total shoulder arthroplasty  again noted. There is increased spacing and change in alignment  between the humeral and glenoid component raising concern for a  component of subluxation/dislocation      Impression: Proximal humeral resection with reverse total shoulder arthroplasty  again noted. There is increased spacing and change in alignment  between the humeral and glenoid component raising concern for a  component of subluxation/dislocation      Signed by: Eric Chaudhry 10/28/2023 2:36 PM  Dictation workstation:   [id number]  10/19/23 X-rays ordered and personally interpreted by me show some evidence of subluxation    Assessment/Plan   Encounter Diagnoses:  Right shoulder pain, unspecified chronicity  Patient is 5+ weeks s/p right shoulder total reverse replacement revision with explant spacer     He continues to have a small amount of subluxation on a few views of his x-rays. He is currently not dislocating. I think we need to continue to give this time to see if nerves begin firing and motor function of his muscles can help strengthen this. He and his wife understand that there is still a chance of another revision surgery to stabilize his shoulder. He may wear his sling for comfort but he should come out to work on elbow and wrist range of motion. He will work on light stretching and pendulum swings for his arm for the next week. We will see him back in 10 days with repeat x-rays at that time. We will discuss what our plan forward is at that time once we have seen his x-rays.  Orders Placed This Encounter    XR  shoulder right 2+ views     Follow up in 10 days with repeat x-rays.    Scribe Attestation  By signing my name below, I, Clarisse Mcintyre   attest that this documentation has been prepared under the direction and in the presence of Romario Zavala MD.

## 2023-11-09 ENCOUNTER — OFFICE VISIT (OUTPATIENT)
Dept: PRIMARY CARE | Facility: CLINIC | Age: 73
End: 2023-11-09
Payer: MEDICARE

## 2023-11-09 ENCOUNTER — LAB (OUTPATIENT)
Dept: LAB | Facility: LAB | Age: 73
End: 2023-11-09
Payer: MEDICARE

## 2023-11-09 VITALS
OXYGEN SATURATION: 93 % | RESPIRATION RATE: 16 BRPM | TEMPERATURE: 98.1 F | SYSTOLIC BLOOD PRESSURE: 119 MMHG | HEART RATE: 57 BPM | BODY MASS INDEX: 35.4 KG/M2 | DIASTOLIC BLOOD PRESSURE: 64 MMHG | WEIGHT: 226 LBS

## 2023-11-09 DIAGNOSIS — I10 PRIMARY HYPERTENSION: ICD-10-CM

## 2023-11-09 DIAGNOSIS — E78.2 MIXED HYPERLIPIDEMIA: ICD-10-CM

## 2023-11-09 DIAGNOSIS — T84.84XS PAIN DUE TO SHOULDER JOINT PROSTHESIS, SEQUELA: ICD-10-CM

## 2023-11-09 DIAGNOSIS — R31.29 OTHER MICROSCOPIC HEMATURIA: ICD-10-CM

## 2023-11-09 DIAGNOSIS — R73.9 ELEVATED BLOOD SUGAR LEVEL: ICD-10-CM

## 2023-11-09 DIAGNOSIS — Z00.00 MEDICARE ANNUAL WELLNESS VISIT, SUBSEQUENT: ICD-10-CM

## 2023-11-09 DIAGNOSIS — Z96.619 PAIN DUE TO SHOULDER JOINT PROSTHESIS, SEQUELA: ICD-10-CM

## 2023-11-09 DIAGNOSIS — R73.9 ELEVATED BLOOD SUGAR LEVEL: Primary | ICD-10-CM

## 2023-11-09 PROBLEM — L57.9 SKIN CHANGES DUE TO CHRONIC EXPOSURE TO NONIONIZING RADIATION, UNSPECIFIED: Status: ACTIVE | Noted: 2018-02-12

## 2023-11-09 PROBLEM — L21.8 OTHER SEBORRHEIC DERMATITIS: Status: ACTIVE | Noted: 2018-02-12

## 2023-11-09 PROBLEM — I77.79: Status: ACTIVE | Noted: 2023-04-26

## 2023-11-09 PROBLEM — D49.2 NEOPLASM OF UNSPECIFIED BEHAVIOR OF BONE, SOFT TISSUE, AND SKIN: Status: ACTIVE | Noted: 2018-02-12

## 2023-11-09 PROBLEM — I25.10 CORONARY ARTERIOSCLEROSIS: Status: ACTIVE | Noted: 2021-03-07

## 2023-11-09 PROBLEM — L81.4 OTHER MELANIN HYPERPIGMENTATION: Status: ACTIVE | Noted: 2018-02-12

## 2023-11-09 PROBLEM — U07.1 PNEUMONIA DUE TO COVID-19 VIRUS: Status: ACTIVE | Noted: 2021-03-07

## 2023-11-09 PROBLEM — R21 RASH AND OTHER NONSPECIFIC SKIN ERUPTION: Status: ACTIVE | Noted: 2018-02-12

## 2023-11-09 PROBLEM — H90.3 SENSORINEURAL HEARING LOSS (SNHL), BILATERAL: Status: ACTIVE | Noted: 2023-10-30

## 2023-11-09 PROBLEM — N17.9 ACUTE KIDNEY INJURY (CMS-HCC): Status: ACTIVE | Noted: 2021-03-08

## 2023-11-09 PROBLEM — J12.82 PNEUMONIA DUE TO COVID-19 VIRUS: Status: ACTIVE | Noted: 2021-03-07

## 2023-11-09 PROBLEM — I25.10 CAD (CORONARY ARTERY DISEASE): Status: ACTIVE | Noted: 2023-11-09

## 2023-11-09 PROBLEM — E66.812 OBESITY, CLASS II, BMI 35-39.9: Status: ACTIVE | Noted: 2021-03-07

## 2023-11-09 PROBLEM — E66.01 SEVERE OBESITY (MULTI): Status: ACTIVE | Noted: 2023-04-26

## 2023-11-09 PROBLEM — J43.2 CENTRILOBULAR EMPHYSEMA (MULTI): Status: ACTIVE | Noted: 2023-04-26

## 2023-11-09 PROBLEM — E66.9 OBESITY, CLASS II, BMI 35-39.9: Status: ACTIVE | Noted: 2021-03-07

## 2023-11-09 PROBLEM — L82.1 OTHER SEBORRHEIC KERATOSIS: Status: ACTIVE | Noted: 2018-02-12

## 2023-11-09 PROBLEM — J96.01 ACUTE RESPIRATORY FAILURE WITH HYPOXIA (MULTI): Status: ACTIVE | Noted: 2021-03-07

## 2023-11-09 PROBLEM — I73.9 PAD (PERIPHERAL ARTERY DISEASE) (CMS-HCC): Status: ACTIVE | Noted: 2023-11-09

## 2023-11-09 PROBLEM — L57.0 ACTINIC KERATOSIS: Status: ACTIVE | Noted: 2018-02-12

## 2023-11-09 LAB
ALBUMIN SERPL BCP-MCNC: 4.3 G/DL (ref 3.4–5)
ALP SERPL-CCNC: 94 U/L (ref 33–136)
ALT SERPL W P-5'-P-CCNC: 11 U/L (ref 10–52)
ANION GAP SERPL CALC-SCNC: 13 MMOL/L (ref 10–20)
AST SERPL W P-5'-P-CCNC: 17 U/L (ref 9–39)
BASOPHILS # BLD AUTO: 0.07 X10*3/UL (ref 0–0.1)
BASOPHILS NFR BLD AUTO: 0.8 %
BILIRUB SERPL-MCNC: 1 MG/DL (ref 0–1.2)
BUN SERPL-MCNC: 16 MG/DL (ref 6–23)
CALCIUM SERPL-MCNC: 9.4 MG/DL (ref 8.6–10.6)
CHLORIDE SERPL-SCNC: 105 MMOL/L (ref 98–107)
CHOLEST SERPL-MCNC: 130 MG/DL (ref 0–199)
CHOLESTEROL/HDL RATIO: 2.4
CO2 SERPL-SCNC: 27 MMOL/L (ref 21–32)
CREAT SERPL-MCNC: 1.01 MG/DL (ref 0.5–1.3)
EOSINOPHIL # BLD AUTO: 0.28 X10*3/UL (ref 0–0.4)
EOSINOPHIL NFR BLD AUTO: 3.3 %
ERYTHROCYTE [DISTWIDTH] IN BLOOD BY AUTOMATED COUNT: 13.1 % (ref 11.5–14.5)
EST. AVERAGE GLUCOSE BLD GHB EST-MCNC: 111 MG/DL
GFR SERPL CREATININE-BSD FRML MDRD: 78 ML/MIN/1.73M*2
GLUCOSE SERPL-MCNC: 113 MG/DL (ref 74–99)
HBA1C MFR BLD: 5.5 %
HCT VFR BLD AUTO: 43.2 % (ref 41–52)
HDLC SERPL-MCNC: 53.5 MG/DL
HGB BLD-MCNC: 13.8 G/DL (ref 13.5–17.5)
IMM GRANULOCYTES # BLD AUTO: 0.02 X10*3/UL (ref 0–0.5)
IMM GRANULOCYTES NFR BLD AUTO: 0.2 % (ref 0–0.9)
LDLC SERPL CALC-MCNC: 59 MG/DL
LYMPHOCYTES # BLD AUTO: 0.95 X10*3/UL (ref 0.8–3)
LYMPHOCYTES NFR BLD AUTO: 11.3 %
MCH RBC QN AUTO: 30 PG (ref 26–34)
MCHC RBC AUTO-ENTMCNC: 31.9 G/DL (ref 32–36)
MCV RBC AUTO: 94 FL (ref 80–100)
MONOCYTES # BLD AUTO: 0.57 X10*3/UL (ref 0.05–0.8)
MONOCYTES NFR BLD AUTO: 6.8 %
NEUTROPHILS # BLD AUTO: 6.54 X10*3/UL (ref 1.6–5.5)
NEUTROPHILS NFR BLD AUTO: 77.6 %
NON HDL CHOLESTEROL: 77 MG/DL (ref 0–149)
NRBC BLD-RTO: 0 /100 WBCS (ref 0–0)
PLATELET # BLD AUTO: 229 X10*3/UL (ref 150–450)
POTASSIUM SERPL-SCNC: 4.2 MMOL/L (ref 3.5–5.3)
PROT SERPL-MCNC: 7.2 G/DL (ref 6.4–8.2)
RBC # BLD AUTO: 4.6 X10*6/UL (ref 4.5–5.9)
SODIUM SERPL-SCNC: 141 MMOL/L (ref 136–145)
TRIGL SERPL-MCNC: 88 MG/DL (ref 0–149)
VLDL: 18 MG/DL (ref 0–40)
WBC # BLD AUTO: 8.4 X10*3/UL (ref 4.4–11.3)

## 2023-11-09 PROCEDURE — 1125F AMNT PAIN NOTED PAIN PRSNT: CPT | Performed by: FAMILY MEDICINE

## 2023-11-09 PROCEDURE — 80061 LIPID PANEL: CPT

## 2023-11-09 PROCEDURE — 99214 OFFICE O/P EST MOD 30 MIN: CPT | Performed by: FAMILY MEDICINE

## 2023-11-09 PROCEDURE — G0439 PPPS, SUBSEQ VISIT: HCPCS | Performed by: FAMILY MEDICINE

## 2023-11-09 PROCEDURE — 36415 COLL VENOUS BLD VENIPUNCTURE: CPT

## 2023-11-09 PROCEDURE — 85025 COMPLETE CBC W/AUTO DIFF WBC: CPT

## 2023-11-09 PROCEDURE — 3008F BODY MASS INDEX DOCD: CPT | Performed by: FAMILY MEDICINE

## 2023-11-09 PROCEDURE — G0444 DEPRESSION SCREEN ANNUAL: HCPCS | Performed by: FAMILY MEDICINE

## 2023-11-09 PROCEDURE — 3078F DIAST BP <80 MM HG: CPT | Performed by: FAMILY MEDICINE

## 2023-11-09 PROCEDURE — 1170F FXNL STATUS ASSESSED: CPT | Performed by: FAMILY MEDICINE

## 2023-11-09 PROCEDURE — 1159F MED LIST DOCD IN RCRD: CPT | Performed by: FAMILY MEDICINE

## 2023-11-09 PROCEDURE — 80053 COMPREHEN METABOLIC PANEL: CPT

## 2023-11-09 PROCEDURE — 1036F TOBACCO NON-USER: CPT | Performed by: FAMILY MEDICINE

## 2023-11-09 PROCEDURE — 3074F SYST BP LT 130 MM HG: CPT | Performed by: FAMILY MEDICINE

## 2023-11-09 PROCEDURE — 83036 HEMOGLOBIN GLYCOSYLATED A1C: CPT

## 2023-11-09 PROCEDURE — 1160F RVW MEDS BY RX/DR IN RCRD: CPT | Performed by: FAMILY MEDICINE

## 2023-11-09 ASSESSMENT — ACTIVITIES OF DAILY LIVING (ADL)
DOING_HOUSEWORK: INDEPENDENT
DRESSING: INDEPENDENT
GROCERY_SHOPPING: INDEPENDENT
BATHING: INDEPENDENT
TAKING_MEDICATION: INDEPENDENT
MANAGING_FINANCES: INDEPENDENT

## 2023-11-09 ASSESSMENT — PATIENT HEALTH QUESTIONNAIRE - PHQ9
1. LITTLE INTEREST OR PLEASURE IN DOING THINGS: NOT AT ALL
SUM OF ALL RESPONSES TO PHQ9 QUESTIONS 1 AND 2: 0
2. FEELING DOWN, DEPRESSED OR HOPELESS: NOT AT ALL

## 2023-11-09 ASSESSMENT — PAIN SCALES - GENERAL: PAINLEVEL: 2

## 2023-11-09 NOTE — PROGRESS NOTES
Subjective   Reason for Visit: Cam Hernandez is an 73 y.o. male here for a Medicare Wellness visit.          Reviewed all medications by prescribing practitioner or clinical pharmacist (such as prescriptions, OTCs, herbal therapies and supplements) and documented in the medical record.    HPI    Patient Care Team:  Demetrio Morejon MD as PCP - General  Demet[facility] Morejon [facility]ributed [facility]     Review of Systems    Objective   Vitals:  /64 (BP Location: Left arm, Patient Position: Sitting, BP Cuff Size: Adult)   Pulse 57   Temp 36.7 °C (98.1 °F) (Temporal)   Resp 16   Wt 103 kg (226 lb)   SpO2 93%   BMI 35.40 kg/m²       Physical Exam    Assessment/Plan   Problem List Items Addressed This Visit       Hyperlipidemia    Hypertension

## 2023-11-09 NOTE — PROGRESS NOTES
Subjective   Patient ID: Cam Hernandez is a 73 y.o. male who presents for Medicare Annual Wellness Visit Subsequent, Blood in Urine (Seeing Urologist thinking he has calcium deposits in kidneys), and Shoulder Problem (Needs to have another surgery on right shoulder needs new parts inserted they want it done before [place]).    HPI   The above chief complaint/information is reviewed.  He is taking and tolerating medications as prescribed, is fasting for blood work.  His 2 main issues are that of his right reverse total shoulder replacement, that will require another surgery before the end of the year.  Apparently the parts are not aligned sufficiently well, and he is at risk for dislocation of another surgery not performed.  His surgeon is Dr. Zavala.  Second, he continues to have gross hematuria with clots.  He has had this intermittently over a number of years, Dr. Saba is his urologist.  He has had a number of scans over the years, cystoscopies, will have another procedure shortly, kidney stones are a probable etiology.    Previous labs reviewed, PSA is up-to-date and normal as of May of this year  Colonoscopy is up-to-date, pneumonia vaccines as well.  Review of Systems  The full, 10+ multi-organ review of systems, is within normal limits with the exception of what is noted above in HPI.  Objective   /64 (BP Location: Left arm, Patient Position: Sitting, BP Cuff Size: Adult)   Pulse 57   Temp 36.7 °C (98.1 °F) (Temporal)   Resp 16   Wt 103 kg (226 lb)   SpO2 93%   BMI 35.40 kg/m²     Physical Exam  Constitutional/General appearance: alert, oriented, well-appearing, in no distress  Head and face exam is normal  No scleral icterus or conjunctival erythema present  Hearing is grossly normal  Respiratory effort is normal, no dyspnea noted  Cortical function is normal  Mood, affect, are pleasant, appropriate, and interactive.  Insight is normal  Cardiac exam reveals a regular rate and rhythm,  soft  murmur present.   Lungs are clear bilaterally.    No lower extremity edema present.  Right shoulder most significantly limited,  strength normal.  Incision is well-healing   Assessment/Plan     Hypertension, hyperlipidemia, all routine medical issues are stable, labs as ordered.    Medicare gaps reviewed  Right shoulder pain, as noted above in HPI, surgery likely before the end of the year.  Microscopic and gross hematuria, managed by Dr. Saba, work-up ongoing    Follow-up as scheduled    **Portions of this medical record have been created using voice recognition software and may have minor errors which are inherent in voice recognition systems. It has not been fully edited for typographical or grammatical errors**

## 2023-11-14 ENCOUNTER — OFFICE VISIT (OUTPATIENT)
Dept: ORTHOPEDIC SURGERY | Facility: HOSPITAL | Age: 73
DRG: 517 | End: 2023-11-14
Payer: MEDICARE

## 2023-11-14 ENCOUNTER — HOSPITAL ENCOUNTER (OUTPATIENT)
Dept: RADIOLOGY | Facility: HOSPITAL | Age: 73
Discharge: HOME | DRG: 517 | End: 2023-11-14
Payer: MEDICARE

## 2023-11-14 DIAGNOSIS — Z47.1 AFTERCARE FOLLOWING JOINT REPLACEMENT SURGERY, UNSPECIFIED JOINT: ICD-10-CM

## 2023-11-14 DIAGNOSIS — S43.004A DISLOCATION, SHOULDER CLOSED, RIGHT, INITIAL ENCOUNTER: Primary | ICD-10-CM

## 2023-11-14 DIAGNOSIS — Z47.1 AFTERCARE FOLLOWING JOINT REPLACEMENT SURGERY, UNSPECIFIED JOINT: Primary | ICD-10-CM

## 2023-11-14 PROCEDURE — 3008F BODY MASS INDEX DOCD: CPT | Performed by: ORTHOPAEDIC SURGERY

## 2023-11-14 PROCEDURE — 73030 X-RAY EXAM OF SHOULDER: CPT | Mod: RT,FY

## 2023-11-14 PROCEDURE — 99024 POSTOP FOLLOW-UP VISIT: CPT | Performed by: ORTHOPAEDIC SURGERY

## 2023-11-14 PROCEDURE — 1160F RVW MEDS BY RX/DR IN RCRD: CPT | Performed by: ORTHOPAEDIC SURGERY

## 2023-11-14 PROCEDURE — 1159F MED LIST DOCD IN RCRD: CPT | Performed by: ORTHOPAEDIC SURGERY

## 2023-11-14 PROCEDURE — 1125F AMNT PAIN NOTED PAIN PRSNT: CPT | Performed by: ORTHOPAEDIC SURGERY

## 2023-11-14 PROCEDURE — 73030 X-RAY EXAM OF SHOULDER: CPT | Mod: RIGHT SIDE | Performed by: RADIOLOGY

## 2023-11-14 PROCEDURE — 1036F TOBACCO NON-USER: CPT | Performed by: ORTHOPAEDIC SURGERY

## 2023-11-14 NOTE — PROGRESS NOTES
Subjective    Patient ID: Cam Hernandez is a 73 y.o. male.    Chief Complaint: No chief complaint on file.     Last Surgery: 1. Right Shoulder Total Reverse Replacement Revision; Explant Spacer   Last Surgery Date: 09/27/23    CAM HERNANDEZ is a pleasant 72 year old male who is accompanied by his wife today. He returns to clinic for a repeat postoperative visit. He is status post right anatomic shoulder revision to spacer placement on 10/5/22 for loosening implant and painful shoulder. He was found to have significant bone loss of glenoid at that time. He has completed a repeat CT scan for pre-operative revision planning.     He reports doing well overall after surgery. He is having some pain in the joint space with active movement, likely due to the spacer. Pain is well managed without use of narcotic pain medication at this time. Over-the-counter pain medication as needed. He is sleeping okay. He denies redness or warmth at incision site. No drainage. Denies any fever, chills, or paresthesia. He has been compliant with restrictions. Doing well with daily home therapy.     10/19/23  Cam returns to the clinic today for his first post operative visit. He is s/p right anatomic shoulder revision to spacer placement on 10/5/22 for loosening implant and painful shoulder.    He explains he has been wearing a sling. He is quite sensitive still. He had some questions regarding what he can and cannot do activity wise today.     10/31/23  Cam returns to the clinic today for his second post operative visit. He is s/p right anatomic shoulder revision to spacer placement on 10/5/22 for loosening implant and painful shoulder.    11/14/23  Cam returns to the clinic today for a third post operative visit. He is s/p right anatomic shoulder revision to spacer placement on 10/5/22 for loosening implant and painful shoulder.    He is here today as his shoulder replacement has unfortunately dislocated. He did try to do his  exercises in hopes that his pain improved but he did not see any relief.           Objective   Patient shoulder incision is dry, intact and healing well. Mild swelling. Mild ecchymosis. No erythema or warmth. Nonabsorbable suture remains. Surgical glue intact. Neurovascularly intact distally. Axillary nerve appears to be firing. 5 out of 5 wrist, hand and thumb flexion and extension without pain. Full range of motion of elbow.     Image Results:  XR shoulder right 2+ views  Narrative: Interpreted By:  Eric Chaudhry,   STUDY:  XR SHOULDER RIGHT 2+ VIEWS      INDICATION:  Signs/Symptoms:pain.      COMPARISON:  October 19, 2023      ACCESSION NUMBER(S):  [id number]      ORDERING CLINICIAN:  CAMILLE ACUÑA      FINDINGS:  Proximal humeral resection with reverse total shoulder arthroplasty  again noted. There is increased spacing and change in alignment  between the humeral and glenoid component raising concern for a  component of subluxation/dislocation      Impression: Proximal humeral resection with reverse total shoulder arthroplasty  again noted. There is increased spacing and change in alignment  between the humeral and glenoid component raising concern for a  component of subluxation/dislocation      Signed by: Eric Chaudhry 10/28/2023 2:36 PM  Dictation workstation:   [id number]  11/14/23 X-rays personally ordered and interpreted by me show dislocated reverse shoulder replacement     10/19/23 X-rays ordered and personally interpreted by me show some evidence of subluxation    Assessment/Plan   Encounter Diagnoses:  Aftercare following joint replacement surgery, unspecified joint  Patient is 6+ weeks s/p many surgeries on his right shoulder, now with a dislocated implant on x-rays.     At this time  we need to revise his shoulder. We talked about risks, benefits and alternatives including nerve injury and bleeding. He cannot stay in a dislocated place. He understands this. We are going to look to see what we  need to do. I cannot do this next week so hopefully we can find a time before I am out of town. I make no guarantees of getting him a stable shoulder as he has many problems with his.     Plan for REVISION of RIGHT shoulder total reverse replacement     Orders Placed This Encounter    XR shoulder right 2+ views     Follow up     Scribe Attestation  By signing my name below, I, Violetta Chiu , Scribe   attest that this documentation has been prepared under the direction and in the presence of Romario Zavala MD.

## 2023-11-15 ENCOUNTER — HOSPITAL ENCOUNTER (INPATIENT)
Facility: HOSPITAL | Age: 73
LOS: 1 days | Discharge: HOME | DRG: 517 | End: 2023-11-16
Attending: ORTHOPAEDIC SURGERY | Admitting: STUDENT IN AN ORGANIZED HEALTH CARE EDUCATION/TRAINING PROGRAM
Payer: MEDICARE

## 2023-11-15 ENCOUNTER — ANESTHESIA (OUTPATIENT)
Dept: OPERATING ROOM | Facility: HOSPITAL | Age: 73
DRG: 517 | End: 2023-11-15
Payer: MEDICARE

## 2023-11-15 ENCOUNTER — APPOINTMENT (OUTPATIENT)
Dept: RADIOLOGY | Facility: HOSPITAL | Age: 73
DRG: 517 | End: 2023-11-15
Payer: MEDICARE

## 2023-11-15 ENCOUNTER — ANESTHESIA EVENT (OUTPATIENT)
Dept: OPERATING ROOM | Facility: HOSPITAL | Age: 73
DRG: 517 | End: 2023-11-15
Payer: MEDICARE

## 2023-11-15 DIAGNOSIS — G89.18 ACUTE POST-OPERATIVE PAIN: ICD-10-CM

## 2023-11-15 DIAGNOSIS — M25.511 CHRONIC RIGHT SHOULDER PAIN: ICD-10-CM

## 2023-11-15 DIAGNOSIS — S43.004A DISLOCATION, SHOULDER CLOSED, RIGHT, INITIAL ENCOUNTER: ICD-10-CM

## 2023-11-15 DIAGNOSIS — G89.29 CHRONIC RIGHT SHOULDER PAIN: ICD-10-CM

## 2023-11-15 DIAGNOSIS — M19.019 SHOULDER ARTHRITIS: Primary | ICD-10-CM

## 2023-11-15 LAB
ABO GROUP (TYPE) IN BLOOD: NORMAL
ANTIBODY SCREEN: NORMAL
RH FACTOR (ANTIGEN D): NORMAL

## 2023-11-15 PROCEDURE — 7100000002 HC RECOVERY ROOM TIME - EACH INCREMENTAL 1 MINUTE: Performed by: ORTHOPAEDIC SURGERY

## 2023-11-15 PROCEDURE — 7100000001 HC RECOVERY ROOM TIME - INITIAL BASE CHARGE: Performed by: ORTHOPAEDIC SURGERY

## 2023-11-15 PROCEDURE — 3700000002 HC GENERAL ANESTHESIA TIME - EACH INCREMENTAL 1 MINUTE: Performed by: ORTHOPAEDIC SURGERY

## 2023-11-15 PROCEDURE — 73020 X-RAY EXAM OF SHOULDER: CPT | Mod: RIGHT SIDE | Performed by: RADIOLOGY

## 2023-11-15 PROCEDURE — 2500000004 HC RX 250 GENERAL PHARMACY W/ HCPCS (ALT 636 FOR OP/ED): Performed by: ANESTHESIOLOGIST ASSISTANT

## 2023-11-15 PROCEDURE — C1713 ANCHOR/SCREW BN/BN,TIS/BN: HCPCS | Performed by: ORTHOPAEDIC SURGERY

## 2023-11-15 PROCEDURE — 86901 BLOOD TYPING SEROLOGIC RH(D): CPT | Performed by: ORTHOPAEDIC SURGERY

## 2023-11-15 PROCEDURE — 1200000002 HC GENERAL ROOM WITH TELEMETRY DAILY

## 2023-11-15 PROCEDURE — 3700000001 HC GENERAL ANESTHESIA TIME - INITIAL BASE CHARGE: Performed by: ORTHOPAEDIC SURGERY

## 2023-11-15 PROCEDURE — 3600000010 HC OR TIME - EACH INCREMENTAL 1 MINUTE - PROCEDURE LEVEL FIVE: Performed by: ORTHOPAEDIC SURGERY

## 2023-11-15 PROCEDURE — 2500000005 HC RX 250 GENERAL PHARMACY W/O HCPCS: Performed by: ANESTHESIOLOGY

## 2023-11-15 PROCEDURE — 2500000005 HC RX 250 GENERAL PHARMACY W/O HCPCS: Performed by: ANESTHESIOLOGIST ASSISTANT

## 2023-11-15 PROCEDURE — 2780000003 HC OR 278 NO HCPCS: Performed by: ORTHOPAEDIC SURGERY

## 2023-11-15 PROCEDURE — 23473 REVIS RECONST SHOULDER JOINT: CPT | Performed by: NURSE PRACTITIONER

## 2023-11-15 PROCEDURE — 2720000007 HC OR 272 NO HCPCS: Performed by: ORTHOPAEDIC SURGERY

## 2023-11-15 PROCEDURE — A4217 STERILE WATER/SALINE, 500 ML: HCPCS | Performed by: ORTHOPAEDIC SURGERY

## 2023-11-15 PROCEDURE — 87070 CULTURE OTHR SPECIMN AEROBIC: CPT | Performed by: NURSE PRACTITIONER

## 2023-11-15 PROCEDURE — 2500000001 HC RX 250 WO HCPCS SELF ADMINISTERED DRUGS (ALT 637 FOR MEDICARE OP): Performed by: NURSE PRACTITIONER

## 2023-11-15 PROCEDURE — 2500000004 HC RX 250 GENERAL PHARMACY W/ HCPCS (ALT 636 FOR OP/ED): Performed by: NURSE PRACTITIONER

## 2023-11-15 PROCEDURE — 73020 X-RAY EXAM OF SHOULDER: CPT | Mod: RT,FY

## 2023-11-15 PROCEDURE — 0RWJ0JZ REVISION OF SYNTHETIC SUBSTITUTE IN RIGHT SHOULDER JOINT, OPEN APPROACH: ICD-10-PCS | Performed by: STUDENT IN AN ORGANIZED HEALTH CARE EDUCATION/TRAINING PROGRAM

## 2023-11-15 PROCEDURE — 36415 COLL VENOUS BLD VENIPUNCTURE: CPT | Performed by: ORTHOPAEDIC SURGERY

## 2023-11-15 PROCEDURE — 23473 REVIS RECONST SHOULDER JOINT: CPT | Performed by: ORTHOPAEDIC SURGERY

## 2023-11-15 PROCEDURE — 3600000005 HC OR TIME - INITIAL BASE CHARGE - PROCEDURE LEVEL FIVE: Performed by: ORTHOPAEDIC SURGERY

## 2023-11-15 PROCEDURE — 87102 FUNGUS ISOLATION CULTURE: CPT | Mod: AHULAB | Performed by: NURSE PRACTITIONER

## 2023-11-15 PROCEDURE — 2500000004 HC RX 250 GENERAL PHARMACY W/ HCPCS (ALT 636 FOR OP/ED): Performed by: ORTHOPAEDIC SURGERY

## 2023-11-15 DEVICE — IMPLANTABLE DEVICE
Type: IMPLANTABLE DEVICE | Site: SHOULDER | Status: FUNCTIONAL
Brand: EQUINOXE

## 2023-11-15 DEVICE — IMPLANTABLE DEVICE: Type: IMPLANTABLE DEVICE | Site: SHOULDER | Status: FUNCTIONAL

## 2023-11-15 RX ORDER — FENTANYL CITRATE 50 UG/ML
INJECTION, SOLUTION INTRAMUSCULAR; INTRAVENOUS AS NEEDED
Status: DISCONTINUED | OUTPATIENT
Start: 2023-11-15 | End: 2023-11-15

## 2023-11-15 RX ORDER — ONDANSETRON HYDROCHLORIDE 2 MG/ML
INJECTION, SOLUTION INTRAVENOUS AS NEEDED
Status: DISCONTINUED | OUTPATIENT
Start: 2023-11-15 | End: 2023-11-15

## 2023-11-15 RX ORDER — OXYCODONE HYDROCHLORIDE 5 MG/1
5 TABLET ORAL EVERY 4 HOURS PRN
Status: DISCONTINUED | OUTPATIENT
Start: 2023-11-15 | End: 2023-11-15 | Stop reason: HOSPADM

## 2023-11-15 RX ORDER — SODIUM CHLORIDE, SODIUM LACTATE, POTASSIUM CHLORIDE, CALCIUM CHLORIDE 600; 310; 30; 20 MG/100ML; MG/100ML; MG/100ML; MG/100ML
100 INJECTION, SOLUTION INTRAVENOUS CONTINUOUS
Status: DISCONTINUED | OUTPATIENT
Start: 2023-11-15 | End: 2023-11-15 | Stop reason: HOSPADM

## 2023-11-15 RX ORDER — DEXAMETHASONE SODIUM PHOSPHATE 4 MG/ML
INJECTION, SOLUTION INTRA-ARTICULAR; INTRALESIONAL; INTRAMUSCULAR; INTRAVENOUS; SOFT TISSUE AS NEEDED
Status: DISCONTINUED | OUTPATIENT
Start: 2023-11-15 | End: 2023-11-15

## 2023-11-15 RX ORDER — ACETAMINOPHEN 325 MG/1
650 TABLET ORAL EVERY 4 HOURS PRN
Status: DISCONTINUED | OUTPATIENT
Start: 2023-11-15 | End: 2023-11-16

## 2023-11-15 RX ORDER — PHENYLEPHRINE HCL IN 0.9% NACL 1 MG/10 ML
SYRINGE (ML) INTRAVENOUS AS NEEDED
Status: DISCONTINUED | OUTPATIENT
Start: 2023-11-15 | End: 2023-11-15

## 2023-11-15 RX ORDER — DOCUSATE SODIUM 100 MG/1
100 CAPSULE, LIQUID FILLED ORAL 2 TIMES DAILY
Status: DISCONTINUED | OUTPATIENT
Start: 2023-11-15 | End: 2023-11-16 | Stop reason: HOSPADM

## 2023-11-15 RX ORDER — MIDAZOLAM HYDROCHLORIDE 1 MG/ML
INJECTION, SOLUTION INTRAMUSCULAR; INTRAVENOUS AS NEEDED
Status: DISCONTINUED | OUTPATIENT
Start: 2023-11-15 | End: 2023-11-15

## 2023-11-15 RX ORDER — CEFAZOLIN 1 G/1
INJECTION, POWDER, FOR SOLUTION INTRAVENOUS AS NEEDED
Status: DISCONTINUED | OUTPATIENT
Start: 2023-11-15 | End: 2023-11-15

## 2023-11-15 RX ORDER — PROPOFOL 10 MG/ML
INJECTION, EMULSION INTRAVENOUS AS NEEDED
Status: DISCONTINUED | OUTPATIENT
Start: 2023-11-15 | End: 2023-11-15

## 2023-11-15 RX ORDER — NALOXONE HYDROCHLORIDE 0.4 MG/ML
0.2 INJECTION, SOLUTION INTRAMUSCULAR; INTRAVENOUS; SUBCUTANEOUS EVERY 5 MIN PRN
Status: DISCONTINUED | OUTPATIENT
Start: 2023-11-15 | End: 2023-11-16 | Stop reason: HOSPADM

## 2023-11-15 RX ORDER — TRANEXAMIC ACID 100 MG/ML
INJECTION, SOLUTION INTRAVENOUS AS NEEDED
Status: DISCONTINUED | OUTPATIENT
Start: 2023-11-15 | End: 2023-11-15

## 2023-11-15 RX ORDER — LIDOCAINE HYDROCHLORIDE 10 MG/ML
0.1 INJECTION, SOLUTION EPIDURAL; INFILTRATION; INTRACAUDAL; PERINEURAL ONCE
Status: DISCONTINUED | OUTPATIENT
Start: 2023-11-15 | End: 2023-11-15 | Stop reason: HOSPADM

## 2023-11-15 RX ORDER — SODIUM CHLORIDE, SODIUM LACTATE, POTASSIUM CHLORIDE, CALCIUM CHLORIDE 600; 310; 30; 20 MG/100ML; MG/100ML; MG/100ML; MG/100ML
50 INJECTION, SOLUTION INTRAVENOUS CONTINUOUS
Status: DISCONTINUED | OUTPATIENT
Start: 2023-11-15 | End: 2023-11-16 | Stop reason: HOSPADM

## 2023-11-15 RX ORDER — SODIUM CHLORIDE, SODIUM LACTATE, POTASSIUM CHLORIDE, CALCIUM CHLORIDE 600; 310; 30; 20 MG/100ML; MG/100ML; MG/100ML; MG/100ML
INJECTION, SOLUTION INTRAVENOUS CONTINUOUS PRN
Status: DISCONTINUED | OUTPATIENT
Start: 2023-11-15 | End: 2023-11-15

## 2023-11-15 RX ORDER — CEFAZOLIN SODIUM 2 G/100ML
2 INJECTION, SOLUTION INTRAVENOUS ONCE
Status: COMPLETED | OUTPATIENT
Start: 2023-11-15 | End: 2023-11-15

## 2023-11-15 RX ORDER — ONDANSETRON 4 MG/1
4 TABLET, FILM COATED ORAL EVERY 8 HOURS PRN
Status: DISCONTINUED | OUTPATIENT
Start: 2023-11-15 | End: 2023-11-16 | Stop reason: HOSPADM

## 2023-11-15 RX ORDER — PHENYLEPHRINE 10 MG/250 ML(40 MCG/ML)IN 0.9 % SOD.CHLORIDE INTRAVENOUS
CONTINUOUS PRN
Status: DISCONTINUED | OUTPATIENT
Start: 2023-11-15 | End: 2023-11-15

## 2023-11-15 RX ORDER — SODIUM CHLORIDE 0.9 G/100ML
IRRIGANT IRRIGATION AS NEEDED
Status: DISCONTINUED | OUTPATIENT
Start: 2023-11-15 | End: 2023-11-15 | Stop reason: HOSPADM

## 2023-11-15 RX ORDER — LIDOCAINE HYDROCHLORIDE 20 MG/ML
INJECTION, SOLUTION INFILTRATION; PERINEURAL AS NEEDED
Status: DISCONTINUED | OUTPATIENT
Start: 2023-11-15 | End: 2023-11-15

## 2023-11-15 RX ORDER — ACETAMINOPHEN 325 MG/1
650 TABLET ORAL EVERY 4 HOURS PRN
Status: DISCONTINUED | OUTPATIENT
Start: 2023-11-15 | End: 2023-11-15 | Stop reason: HOSPADM

## 2023-11-15 RX ORDER — ALBUTEROL SULFATE 0.83 MG/ML
2.5 SOLUTION RESPIRATORY (INHALATION) ONCE AS NEEDED
Status: DISCONTINUED | OUTPATIENT
Start: 2023-11-15 | End: 2023-11-15 | Stop reason: HOSPADM

## 2023-11-15 RX ORDER — DOCUSATE SODIUM 100 MG/1
200 CAPSULE, LIQUID FILLED ORAL 2 TIMES DAILY
Qty: 28 CAPSULE | Refills: 0 | Status: SHIPPED | OUTPATIENT
Start: 2023-11-15 | End: 2023-11-22

## 2023-11-15 RX ORDER — ROCURONIUM BROMIDE 10 MG/ML
INJECTION, SOLUTION INTRAVENOUS AS NEEDED
Status: DISCONTINUED | OUTPATIENT
Start: 2023-11-15 | End: 2023-11-15

## 2023-11-15 RX ORDER — GLYCOPYRROLATE 0.2 MG/ML
INJECTION INTRAMUSCULAR; INTRAVENOUS AS NEEDED
Status: DISCONTINUED | OUTPATIENT
Start: 2023-11-15 | End: 2023-11-15

## 2023-11-15 RX ORDER — OXYCODONE AND ACETAMINOPHEN 5; 325 MG/1; MG/1
1 TABLET ORAL EVERY 4 HOURS PRN
Status: DISCONTINUED | OUTPATIENT
Start: 2023-11-15 | End: 2023-11-16

## 2023-11-15 RX ORDER — IPRATROPIUM BROMIDE 0.5 MG/2.5ML
500 SOLUTION RESPIRATORY (INHALATION) ONCE
Status: DISCONTINUED | OUTPATIENT
Start: 2023-11-15 | End: 2023-11-15 | Stop reason: HOSPADM

## 2023-11-15 RX ORDER — ASPIRIN 81 MG/1
81 TABLET ORAL DAILY
Status: DISCONTINUED | OUTPATIENT
Start: 2023-11-16 | End: 2023-11-16 | Stop reason: HOSPADM

## 2023-11-15 RX ORDER — ONDANSETRON HYDROCHLORIDE 2 MG/ML
4 INJECTION, SOLUTION INTRAVENOUS ONCE AS NEEDED
Status: DISCONTINUED | OUTPATIENT
Start: 2023-11-15 | End: 2023-11-15 | Stop reason: HOSPADM

## 2023-11-15 RX ORDER — ONDANSETRON HYDROCHLORIDE 2 MG/ML
4 INJECTION, SOLUTION INTRAVENOUS EVERY 8 HOURS PRN
Status: DISCONTINUED | OUTPATIENT
Start: 2023-11-15 | End: 2023-11-16 | Stop reason: HOSPADM

## 2023-11-15 RX ORDER — OXYCODONE AND ACETAMINOPHEN 5; 325 MG/1; MG/1
1 TABLET ORAL EVERY 6 HOURS PRN
Qty: 28 TABLET | Refills: 0 | Status: SHIPPED | OUTPATIENT
Start: 2023-11-15 | End: 2023-11-16 | Stop reason: HOSPADM

## 2023-11-15 RX ADMIN — Medication 200 MCG: at 14:34

## 2023-11-15 RX ADMIN — Medication 100 MCG: at 14:31

## 2023-11-15 RX ADMIN — GLYCOPYRROLATE 0.2 MG: 0.2 INJECTION INTRAMUSCULAR; INTRAVENOUS at 14:28

## 2023-11-15 RX ADMIN — Medication 200 MCG: at 14:47

## 2023-11-15 RX ADMIN — TRANEXAMIC ACID 1000 MG: 100 INJECTION, SOLUTION INTRAVENOUS at 14:06

## 2023-11-15 RX ADMIN — ROCURONIUM BROMIDE 70 MG: 10 INJECTION, SOLUTION INTRAVENOUS at 14:02

## 2023-11-15 RX ADMIN — PROPOFOL 150 MG: 10 INJECTION, EMULSION INTRAVENOUS at 14:02

## 2023-11-15 RX ADMIN — CEFAZOLIN SODIUM 2 G: 1 POWDER, FOR SOLUTION INTRAMUSCULAR; INTRAVENOUS at 14:06

## 2023-11-15 RX ADMIN — DOCUSATE SODIUM 100 MG: 100 CAPSULE, LIQUID FILLED ORAL at 20:58

## 2023-11-15 RX ADMIN — Medication 0.5 MCG/KG/MIN: at 14:51

## 2023-11-15 RX ADMIN — SODIUM CHLORIDE, POTASSIUM CHLORIDE, SODIUM LACTATE AND CALCIUM CHLORIDE: 600; 310; 30; 20 INJECTION, SOLUTION INTRAVENOUS at 13:56

## 2023-11-15 RX ADMIN — ONDANSETRON 4 MG: 2 INJECTION INTRAMUSCULAR; INTRAVENOUS at 15:47

## 2023-11-15 RX ADMIN — FENTANYL CITRATE 100 MCG: 50 INJECTION, SOLUTION INTRAMUSCULAR; INTRAVENOUS at 14:02

## 2023-11-15 RX ADMIN — PROPOFOL 20 MG: 10 INJECTION, EMULSION INTRAVENOUS at 15:58

## 2023-11-15 RX ADMIN — MIDAZOLAM HYDROCHLORIDE 2 MG: 1 INJECTION, SOLUTION INTRAMUSCULAR; INTRAVENOUS at 13:52

## 2023-11-15 RX ADMIN — CEFAZOLIN SODIUM 2 G: 2 INJECTION, SOLUTION INTRAVENOUS at 19:24

## 2023-11-15 RX ADMIN — SODIUM CHLORIDE, POTASSIUM CHLORIDE, SODIUM LACTATE AND CALCIUM CHLORIDE 50 ML/HR: 600; 310; 30; 20 INJECTION, SOLUTION INTRAVENOUS at 19:21

## 2023-11-15 RX ADMIN — LIDOCAINE HYDROCHLORIDE 100 MG: 20 INJECTION, SOLUTION INFILTRATION; PERINEURAL at 14:02

## 2023-11-15 RX ADMIN — PROPOFOL 30 MG: 10 INJECTION, EMULSION INTRAVENOUS at 16:01

## 2023-11-15 RX ADMIN — Medication 3 L/MIN: at 17:10

## 2023-11-15 RX ADMIN — SUGAMMADEX 200 MG: 100 INJECTION, SOLUTION INTRAVENOUS at 16:01

## 2023-11-15 RX ADMIN — DEXAMETHASONE SODIUM PHOSPHATE 4 MG: 4 INJECTION, SOLUTION INTRA-ARTICULAR; INTRALESIONAL; INTRAMUSCULAR; INTRAVENOUS; SOFT TISSUE at 14:11

## 2023-11-15 SDOH — SOCIAL STABILITY: SOCIAL INSECURITY: ARE THERE ANY APPARENT SIGNS OF INJURIES/BEHAVIORS THAT COULD BE RELATED TO ABUSE/NEGLECT?: NO

## 2023-11-15 SDOH — SOCIAL STABILITY: SOCIAL INSECURITY: ARE YOU OR HAVE YOU BEEN THREATENED OR ABUSED PHYSICALLY, EMOTIONALLY, OR SEXUALLY BY ANYONE?: NO

## 2023-11-15 SDOH — SOCIAL STABILITY: SOCIAL INSECURITY: ABUSE: ADULT

## 2023-11-15 SDOH — SOCIAL STABILITY: SOCIAL INSECURITY: HAS ANYONE EVER THREATENED TO HURT YOUR FAMILY OR YOUR PETS?: NO

## 2023-11-15 SDOH — SOCIAL STABILITY: SOCIAL INSECURITY: DO YOU FEEL ANYONE HAS EXPLOITED OR TAKEN ADVANTAGE OF YOU FINANCIALLY OR OF YOUR PERSONAL PROPERTY?: NO

## 2023-11-15 SDOH — SOCIAL STABILITY: SOCIAL INSECURITY: DO YOU FEEL UNSAFE GOING BACK TO THE PLACE WHERE YOU ARE LIVING?: NO

## 2023-11-15 SDOH — SOCIAL STABILITY: SOCIAL INSECURITY: HAVE YOU HAD THOUGHTS OF HARMING ANYONE ELSE?: NO

## 2023-11-15 SDOH — SOCIAL STABILITY: SOCIAL INSECURITY: DOES ANYONE TRY TO KEEP YOU FROM HAVING/CONTACTING OTHER FRIENDS OR DOING THINGS OUTSIDE YOUR HOME?: NO

## 2023-11-15 SDOH — SOCIAL STABILITY: SOCIAL INSECURITY: WERE YOU ABLE TO COMPLETE ALL THE BEHAVIORAL HEALTH SCREENINGS?: YES

## 2023-11-15 ASSESSMENT — PAIN SCALES - GENERAL

## 2023-11-15 ASSESSMENT — PAIN - FUNCTIONAL ASSESSMENT
PAIN_FUNCTIONAL_ASSESSMENT: 0-10

## 2023-11-15 ASSESSMENT — LIFESTYLE VARIABLES
AUDIT-C TOTAL SCORE: 0
AUDIT-C TOTAL SCORE: 0
HOW OFTEN DO YOU HAVE 6 OR MORE DRINKS ON ONE OCCASION: NEVER
SKIP TO QUESTIONS 9-10: 1
HOW MANY STANDARD DRINKS CONTAINING ALCOHOL DO YOU HAVE ON A TYPICAL DAY: PATIENT DOES NOT DRINK
HOW OFTEN DO YOU HAVE A DRINK CONTAINING ALCOHOL: NEVER

## 2023-11-15 ASSESSMENT — PATIENT HEALTH QUESTIONNAIRE - PHQ9
SUM OF ALL RESPONSES TO PHQ9 QUESTIONS 1 & 2: 0
2. FEELING DOWN, DEPRESSED OR HOPELESS: NOT AT ALL
1. LITTLE INTEREST OR PLEASURE IN DOING THINGS: NOT AT ALL

## 2023-11-15 ASSESSMENT — ACTIVITIES OF DAILY LIVING (ADL)
FEEDING YOURSELF: INDEPENDENT
HEARING - RIGHT EAR: FUNCTIONAL
BATHING: NEEDS ASSISTANCE
GROOMING: INDEPENDENT
WALKS IN HOME: NEEDS ASSISTANCE
PATIENT'S MEMORY ADEQUATE TO SAFELY COMPLETE DAILY ACTIVITIES?: YES
JUDGMENT_ADEQUATE_SAFELY_COMPLETE_DAILY_ACTIVITIES: YES
LACK_OF_TRANSPORTATION: NO
ADEQUATE_TO_COMPLETE_ADL: YES
HEARING - LEFT EAR: FUNCTIONAL
DRESSING YOURSELF: NEEDS ASSISTANCE
TOILETING: INDEPENDENT

## 2023-11-15 ASSESSMENT — COGNITIVE AND FUNCTIONAL STATUS - GENERAL
DRESSING REGULAR UPPER BODY CLOTHING: A LITTLE
STANDING UP FROM CHAIR USING ARMS: A LITTLE
HELP NEEDED FOR BATHING: A LITTLE
CLIMB 3 TO 5 STEPS WITH RAILING: A LITTLE
MOBILITY SCORE: 22
PATIENT BASELINE BEDBOUND: NO
DAILY ACTIVITIY SCORE: 21
DRESSING REGULAR LOWER BODY CLOTHING: A LITTLE

## 2023-11-15 ASSESSMENT — COLUMBIA-SUICIDE SEVERITY RATING SCALE - C-SSRS
2. HAVE YOU ACTUALLY HAD ANY THOUGHTS OF KILLING YOURSELF?: NO
6. HAVE YOU EVER DONE ANYTHING, STARTED TO DO ANYTHING, OR PREPARED TO DO ANYTHING TO END YOUR LIFE?: NO
1. IN THE PAST MONTH, HAVE YOU WISHED YOU WERE DEAD OR WISHED YOU COULD GO TO SLEEP AND NOT WAKE UP?: NO

## 2023-11-15 NOTE — SIGNIFICANT EVENT
Report sent to April RN 7th floor.   [General Appearance - Well Developed] : well developed [General Appearance - Well Nourished] : well nourished [Normal Appearance] : normal appearance [Well Groomed] : well groomed [General Appearance - In No Acute Distress] : no acute distress [Abdomen Soft] : soft [Abdomen Tenderness] : non-tender [Costovertebral Angle Tenderness] : no ~M costovertebral angle tenderness [Urinary Bladder Findings] : the bladder was normal on palpation [FreeTextEntry1] : 1-2+ lower extremity edema [] : no respiratory distress [Respiration, Rhythm And Depth] : normal respiratory rhythm and effort [Exaggerated Use Of Accessory Muscles For Inspiration] : no accessory muscle use [Oriented To Time, Place, And Person] : oriented to person, place, and time [Affect] : the affect was normal [Mood] : the mood was normal [Not Anxious] : not anxious [Normal Station and Gait] : the gait and station were normal for the patient's age [No Focal Deficits] : no focal deficits [No Palpable Adenopathy] : no palpable adenopathy

## 2023-11-15 NOTE — SIGNIFICANT EVENT
Patient tx to inpt unit in stable condition. Family updated via phone call. All belongings with pt.

## 2023-11-15 NOTE — DISCHARGE INSTRUCTIONS
. A sling has been provided for you. Remain in your sling at all times. This includes     sleeping in your sling. *You may come out of your sling for daily exercises only.    Do not actively move your shoulder during this time. Active reaching and lifting away      from the body are NOT permitted.    You may use the operative arm for activities of daily living that do not require the        operative arm to leave the side of the body. Such as: eating, drinking and bathing.    Remain non-weight bearing with the operative arm until you are seen post operatively.   Remove your arm from the sling to perform active elbow, wrist and hand range of   motion exercises 4-5x per day. This will help alleviate joint stiffness and swelling in        your arm and hand.    Use cryotherapy machine or ice on the shoulder intermittently over the first 72 hours up     to the first 2 weeks following surgery.   Pain medication has been prescribed for you. Use your medicine liberally over the first   72 hours and only take if you are experiencing pain. You can then begin to taper your     use. You may take Extra Strength Tylenol or Tylenol only in place of the pain pills. *DO NOT TAKE ANY nonsteroidal anti-inflammatory pain medications: Advil,   Motrin, Ibuprofen, Aleve, Naproxen or Naprosyn. *UNLESS PRESCRIBED   Dressing to remain in place until post op day 2. Can shower post op day 4. Simply allow the water to wash over the site and then pat dry. Do not rub the dressing. Make sure the dressing is completely dry after showering.   Do not use any aerosol deodorants or lotions on or near surgical incision.   Aspirin has been prescribed to prevent blood clots. Take one aspirin (81 mg) tablet      once per day for 2 weeks after surgery, unless you have an aspirin sensitivity or allergy,      asthma or are on blood thinners. If Coumadin, Plavix, Xarelto or other blood thinning      medication is prescribed for blood clot prevention, take this  medication as directed by        your medical clearance physician.   *DO NOT TAKE ASPRIN AND ANOTHER BLOOD THINNER!!

## 2023-11-15 NOTE — ANESTHESIA PREPROCEDURE EVALUATION
Patient: Cam Hernandez    Procedure Information       Date/Time: 11/15/23 1530    Procedure: Right Shoulder Replacement Revision (Right: Shoulder)    Location: [facility] OR 11 / [facility] OR    Surgeons: Romario Zavala MD            Relevant Problems   Cardiovascular   (+) Angina pectoris (CMS/HCC)   (+) CAD (coronary artery disease)   (+) Carotid artery stenosis   (+) Coronary arteriosclerosis   (+) Hyperlipidemia   (+) Hypertension   (+) PAD (peripheral artery disease) (CMS/HCC)      Endocrine   (+) Severe obesity (CMS/HCC)      GI   (+) Esophageal reflux      /Renal   (+) Acute kidney injury (CMS/HCC)   (+) Renal insufficiency      Neuro/Psych   (+) Carotid artery stenosis      Pulmonary   (+) Centrilobular emphysema (CMS/HCC)   (+) Pneumonia due to COVID-19 virus      Hematology   (+) Thrombocytopenia (CMS/HCC)      Eyes, Ears, Nose, and Throat   (+) Hearing loss   (+) Sensorineural hearing loss (SNHL), bilateral      Infectious Disease   (+) Pneumonia due to COVID-19 virus       Clinical information reviewed:   Tobacco  Allergies  Meds  Problems  Med Hx  Surg Hx   Fam Hx  Soc   Hx        NPO Detail:  No data recorded     Physical Exam    Airway  Mallampati: III     Cardiovascular   Rhythm: regular  Rate: normal     Dental    Pulmonary   Breath sounds clear to auscultation     Abdominal            Anesthesia Plan    ASA 3     general and regional   (Nerve Block for post op analgesia)  intravenous induction   Anesthetic plan and risks discussed with patient.    Plan discussed with CRNA and CAA.

## 2023-11-15 NOTE — ANESTHESIA PROCEDURE NOTES
Airway  Date/Time: 11/15/2023 2:05 PM  Urgency: elective      Staffing  Performed: ODETTE   Authorized by: Enrrique Flor MD    Performed by: ODETTE Jimenez  Patient location during procedure: OR    Indications and Patient Condition  Indications for airway management: anesthesia and airway protection  Spontaneous ventilation: present  Sedation level: deep  Preoxygenated: yes  Patient position: sniffing  Mask difficulty assessment: 1 - vent by mask  Planned trial extubation    Final Airway Details  Final airway type: endotracheal airway      Successful airway: ETT  Cuffed: yes   Successful intubation technique: direct laryngoscopy  Blade: Susan  Blade size: #4  ETT size (mm): 7.0  Cormack-Lehane Classification: grade IIa - partial view of glottis  Placement verified by: chest auscultation and capnometry   Measured from: teeth  ETT to teeth (cm): 22  Number of attempts at approach: 1

## 2023-11-15 NOTE — ANESTHESIA PROCEDURE NOTES
Peripheral Block    Patient location during procedure: pre-op  Start time: 11/15/2023 1:43 PM  End time: 11/15/2023 1:51 PM  Reason for block: procedure for pain, at surgeon's request and post-op pain management  Staffing  Performed: attending   Authorized by: Enrrique Flor MD    Performed by: Enrrique Flor MD  Preanesthetic Checklist  Completed: patient identified, IV checked, site marked, risks and benefits discussed, surgical consent, monitors and equipment checked, pre-op evaluation and timeout performed   Timeout performed at: 11/15/2023 1:43 PM  Peripheral Block  Patient position: sitting  Prep: alcohol swabs  Patient monitoring: continuous pulse ox  Block type: interscalene  Laterality: right  Injection technique: single-shot  Guidance: ultrasound guided  Local infiltration: lidocaine  Needle  Needle type: short-bevel   Needle gauge: 22 G  Needle length: 5 cm  Needle localization: ultrasound guidance     image stored in chart  Test dose: negative  Assessment  Injection assessment: negative aspiration for heme, no paresthesia on injection, incremental injection and local visualized surrounding nerve on ultrasound  Heart rate change: no  Slow fractionated injection: yes  Additional Notes  20 ml 0.375% bupivacaine with 1:200K epi and 2 mg dexamethasone injected

## 2023-11-15 NOTE — HOSPITAL COURSE
Patient is a 73M who presented after right shoulder periprosthetic dislocation. They are now s/p revision right shoulder reverse on 11/15/23 by Dr. Zavala. Following the surgery the patient recovered in the PACU prior to being transferred to the regular nursing floor.  The patient was provided with oxycodone for moderate and severe pain and dilaudid for breakthrough pain as needed. Given ASA 81mg daily for DVT prophylaxis. The patient worked with physical therapy/ occupational therapy who recommended continued therapy. On the day of discharge, the patient's vital signs were stable. They were provided with prescriptions for pain control and aspirin 81mg to take daily x 14 days. They should follow up with Dr. Zavala in 3 weeks.

## 2023-11-15 NOTE — BRIEF OP NOTE
Date: 11/15/2023  OR Location: [facility] OR    Name: Cam Hernandez, : 1950, Age: 73 y.o., MRN: [id number], Sex: male    Diagnosis  Pre-op Diagnosis     * Dislocation, shoulder closed, right, initial encounter [S43.004A] Post-op Diagnosis     * Dislocation, shoulder closed, right, initial encounter [S43.004A]     Procedures  Right Shoulder Replacement Revision  [id number] - [place] JORDAN SHOULDER ARTHRPLSTY HUMERAL&GLENOID COMPNT      Surgeons      * Romario Zavala - Primary    Resident/Fellow/Other Assistant:  Surgeon(s) and Role:     * Geoffrey Boyle DO - Resident - Assisting  Ronda Naranjo CNP- assisting     Procedure Summary  Anesthesia: Consult  ASA: III  Anesthesia Staff: Anesthesiologist: Enrrique Flor MD  C-AA: ODETTE Jimenez  Estimated Blood Loss: 50mL  Intra-op Medications: * No intraprocedure medications in log *           Anesthesia Record               Intraprocedure I/O Totals          Intake    Propofol Drip 0.00 mL    The total shown is the total volume documented since Anesthesia Start was filed.    Phenylephrine Drip 0.00 mL    The total shown is the total volume documented since Anesthesia Start was filed.    Total Intake 0 mL          Specimen:   ID Type Source Tests Collected by Time   A : #1 Swab SHOULDER ARTHROPLASTY RIGHT FUNGAL CULTURE/SMEAR, TISSUE/WOUND CULTURE/SMEAR Romario Zavala MD 11/15/2023 143   B : #2 Swab SHOULDER ARTHROPLASTY RIGHT FUNGAL CULTURE/SMEAR, TISSUE/WOUND CULTURE/SMEAR Romario Zavala MD 11/15/2023 1436   C : #3 Swab SHOULDER ARTHROPLASTY RIGHT FUNGAL CULTURE/SMEAR, TISSUE/WOUND CULTURE/SMEAR Romario Zavala MD 11/15/2023 1436   D : #4 Swab SHOULDER ARTHROPLASTY RIGHT FUNGAL CULTURE/SMEAR, TISSUE/WOUND CULTURE/SMEAR Romario Zavala MD 11/15/2023 1436   E : #5 Swab SHOULDER ARTHROPLASTY RIGHT FUNGAL CULTURE/SMEAR, TISSUE/WOUND CULTURE/SMEAR Romario Zavala MD 11/15/2023 143   F : #6 Swab SHOULDER ARTHROPLASTY RIGHT FUNGAL CULTURE/SMEAR,  TISSUE/WOUND CULTURE/SMEAR Romario Zavala MD 11/15/2023 1436        Staff:   Circulator: Martir Vieira RN  Relief Circulator: Do Quinones RN  Relief Scrub: Meaghan Walker  Scrub Person: Aysha Blanca; Makenna Pavonhollis          Findings: right shoulder periprosthetic dislocation     Complications:  None; patient tolerated the procedure well.     Disposition: PACU - hemodynamically stable.  Condition: stable  Specimens Collected:   ID Type Source Tests Collected by Time   A : #1 Swab SHOULDER ARTHROPLASTY RIGHT FUNGAL CULTURE/SMEAR, TISSUE/WOUND CULTURE/SMEAR Romario Zavala MD 11/15/2023 1436   B : #2 Swab SHOULDER ARTHROPLASTY RIGHT FUNGAL CULTURE/SMEAR, TISSUE/WOUND CULTURE/SMEAR Romario Zavala MD 11/15/2023 1436   C : #3 Swab SHOULDER ARTHROPLASTY RIGHT FUNGAL CULTURE/SMEAR, TISSUE/WOUND CULTURE/SMEAR Romario Zavala MD 11/15/2023 1436   D : #4 Swab SHOULDER ARTHROPLASTY RIGHT FUNGAL CULTURE/SMEAR, TISSUE/WOUND CULTURE/SMEAR Romario Zavala MD 11/15/2023 1436   E : #5 Swab SHOULDER ARTHROPLASTY RIGHT FUNGAL CULTURE/SMEAR, TISSUE/WOUND CULTURE/SMEAR Romario Zavala MD 11/15/2023 1436   F : #6 Swab SHOULDER ARTHROPLASTY RIGHT FUNGAL CULTURE/SMEAR, TISSUE/WOUND CULTURE/SMEAR Romario Zavala MD 11/15/2023 1436     Attending Attestation: I was present and scrubbed for the entire procedure.    Romario Zavala  Phone Number: [phone]

## 2023-11-15 NOTE — ANESTHESIA POSTPROCEDURE EVALUATION
Patient: Cam Hernandez    Procedure Summary       Date: 11/15/23 Room / Location: [facility] OR 11 / [facility] OR    Anesthesia Start: 1346 Anesthesia Stop: 1615    Procedure: Right Shoulder Replacement Revision (Right: Shoulder) Diagnosis:       Dislocation, shoulder closed, right, initial encounter      (Dislocation, shoulder closed, right, initial encounter [S43.004A])    Surgeons: Romario Zavala MD Responsible Provider: Enrrique Flor MD    Anesthesia Type: general, regional ASA Status: 3            Anesthesia Type: general, regional    Vitals Value Taken Time   BP  11/15/23 1623   Temp  11/15/23 1623   Pulse  11/15/23 1623   Resp  11/15/23 1623   SpO2  11/15/23 1623       Anesthesia Post Evaluation    Patient location during evaluation: bedside  Level of consciousness: awake  Pain management: adequate  Airway patency: patent  Cardiovascular status: acceptable  Respiratory status: acceptable  Hydration status: acceptable  Postoperative Nausea and Vomiting: none        No notable events documented.

## 2023-11-15 NOTE — OP NOTE
Right Shoulder Replacement Revision (R) Operative Note     Date: 11/15/2023  OR Location: [facility] A OR    Name: Cam Hernandez : 1950, Age: 73 y.o., MRN: [id number], Sex: male    Diagnosis  Pre-op Diagnosis     * Dislocation, shoulder closed, right, initial encounter [S43.004A] Post-op Diagnosis     * Dislocation, shoulder closed, right, initial encounter [S43.004A]     Procedures  Right Shoulder Replacement Revision  [id number] - [place] JORDAN SHOULDER ARTHRPLSTY HUMERAL&GLENOID COMPNT      Surgeons      * Romario Zavala - Primary    Resident/Fellow/Other Assistant:  Surgeon(s) and Role:     * Geoffrey Boyle DO - Resident - Assisting    Procedure Summary  Anesthesia: Consult  ASA: III  Anesthesia Staff: Anesthesiologist: Enrrique Flor MD  C-AA: ODETTE Jimenez  Estimated Blood Loss: 50mL  Intra-op Medications: * No intraprocedure medications in log *           Anesthesia Record               Intraprocedure I/O Totals          Intake    Propofol Drip 0.00 mL    The total shown is the total volume documented since Anesthesia Start was filed.    Phenylephrine Drip 0.00 mL    The total shown is the total volume documented since Anesthesia Start was filed.    Total Intake 0 mL          Specimen:   ID Type Source Tests Collected by Time   A : #1 Swab SHOULDER ARTHROPLASTY RIGHT FUNGAL CULTURE/SMEAR, TISSUE/WOUND CULTURE/SMEAR Romario Zavala MD 11/15/2023 1436   B : #2 Swab SHOULDER ARTHROPLASTY RIGHT FUNGAL CULTURE/SMEAR, TISSUE/WOUND CULTURE/SMEAR Romario Zavala MD 11/15/2023 1436   C : #3 Swab SHOULDER ARTHROPLASTY RIGHT FUNGAL CULTURE/SMEAR, TISSUE/WOUND CULTURE/SMEAR Romario Zavala MD 11/15/2023 1436   D : #4 Swab SHOULDER ARTHROPLASTY RIGHT FUNGAL CULTURE/SMEAR, TISSUE/WOUND CULTURE/SMEAR Romario Zavala MD 11/15/2023 1436   E : #5 Swab SHOULDER ARTHROPLASTY RIGHT FUNGAL CULTURE/SMEAR, TISSUE/WOUND CULTURE/SMEAR Romario Zavala MD 11/15/2023 1436   F : #6 Swab SHOULDER ARTHROPLASTY RIGHT  FUNGAL CULTURE/SMEAR, TISSUE/WOUND CULTURE/SMEAR Romario Zavala MD 11/15/2023 8542        Staff:   Circulator: Martir Vieira RN  Relief Circulator: Do Quinones RN  Relief Scrub: Meaghan Walker  Scrub Person: Aysha Blanca; Makenna Moncada         Drains and/or Catheters: * None in log *    Tourniquet Times:         Implants:  Implants       Type Name Action Serial No.      Joint SCREW, TORQUE DEFINING KIT - [id number] - [id number] Implanted [id number]     Tray EQUINOXE REVERSE SHOOULDER Implanted [id number]     Tray EQUINOXE Implanted [id number]              Findings: Consistent with diagnosis patient had well fixed implants and dislocated shoulder.  Initially was quite difficult to achieve reduction once it was reduced and tension was allowed.  You can then dislocate laterally.  Anterior posterior instability was not there about 30 degrees external rotation until some degree of impingement but no evidence of levering    Indications: Cam Hernandez is an 73 y.o. male who is having surgery for Dislocation, shoulder closed, right, initial encounter [S43.004A].  Shoulder arthritis    The patient was seen in the preoperative area. The risks, benefits, complications, treatment options, non-operative alternatives, expected recovery and outcomes were discussed with the patient. The possibilities of reaction to medication, pulmonary aspiration, injury to surrounding structures, bleeding, recurrent infection, the need for additional procedures, failure to diagnose a condition, and creating a complication requiring transfusion or operation were discussed with the patient. The patient concurred with the proposed plan, giving informed consent.  The site of surgery was properly noted/marked if necessary per policy. The patient has been actively warmed in preoperative area. Preoperative antibiotics have been ordered and given within 1 hours of incision. Venous thrombosis prophylaxis have been ordered including  bilateral sequential compression devices    Procedure Details: Patient was seen in preop holding identifies correct patient shoulder was marked as the correct operative site spent alternatives of revision surgery were discussed at length with the patient they understood they wished to proceed.  Shoulder was marked interscalene nerve block was performed.  Risks were further dislocation infection bleeding nerve injury.    Operative report patient was brought to the operating room we performed a huddle.  Correct patient correct operative site.  Sedated bay by anesthesia positioned in the 60 degree beachchair position all bony prominences were well-padded.  We prepped and draped him in standard fashion.  We performed a timeout.  Antibiotics trans-Otilio acid were dosed appropriately.  We then made a standard deltopectoral incision was created soft tissue over top the deltopectoral interval.  We identified the deltopectoral interval.  Clear joint fluid was encountered.  We removed the suture that had closed the deltopectoral interval.  We then reduced the shoulder with the current implant that was a little bit difficult.  Held it for a little while dislocated straight laterally.  Assessed the version of both the glenoid and humeral component felt they were within acceptable range.  I created some soft tissue releases anteriorly and posteriorly on the glenoid side.  This was a custom implant it was not removed.  I increased the size to a 10 mm tray 2.5 mm not constrained liner.  This increased 7.5 mm from what we had.  This was very difficult to reduce and you could not dislocated laterally.  Internal rotation and anterior force did not cause instability had excellent deltoid strength.  I made decision that actually external Tatian would not help this patient immobilization so we placed him in neutral rotation postop.  Copiously irrigated the wound cultures x6 were obtained with the joint fluid was obtained held for  extended incubation.  Closed in standard fashion over top of drain.  Patient was woken by anesthesia transferred to PACU where there recovering  Please note that we will be billing for my nurse practitioner's first assist as she was critical in the center for successful completion of the case including positioning of the body, retraction, suture management, placement of the implants and wound closure. There was no qualified resident available for the entire case  Complications:  None; patient tolerated the procedure well.    Disposition: PACU - hemodynamically stable.  Condition: stable         Additional Details: None    Attending Attestation: I was present and scrubbed for the entire procedure.    Romario Zavala  Phone Number: [phone]

## 2023-11-16 VITALS
HEIGHT: 68 IN | OXYGEN SATURATION: 95 % | SYSTOLIC BLOOD PRESSURE: 117 MMHG | RESPIRATION RATE: 16 BRPM | WEIGHT: 228.62 LBS | BODY MASS INDEX: 34.65 KG/M2 | DIASTOLIC BLOOD PRESSURE: 55 MMHG | TEMPERATURE: 98.2 F | HEART RATE: 54 BPM

## 2023-11-16 PROCEDURE — 9420000001 HC RT PATIENT EDUCATION 5 MIN

## 2023-11-16 PROCEDURE — 97535 SELF CARE MNGMENT TRAINING: CPT | Mod: GO

## 2023-11-16 PROCEDURE — 2500000001 HC RX 250 WO HCPCS SELF ADMINISTERED DRUGS (ALT 637 FOR MEDICARE OP): Performed by: NURSE PRACTITIONER

## 2023-11-16 PROCEDURE — 2500000001 HC RX 250 WO HCPCS SELF ADMINISTERED DRUGS (ALT 637 FOR MEDICARE OP): Performed by: PEDIATRICS

## 2023-11-16 PROCEDURE — 2500000001 HC RX 250 WO HCPCS SELF ADMINISTERED DRUGS (ALT 637 FOR MEDICARE OP): Performed by: STUDENT IN AN ORGANIZED HEALTH CARE EDUCATION/TRAINING PROGRAM

## 2023-11-16 PROCEDURE — 97110 THERAPEUTIC EXERCISES: CPT | Mod: GO

## 2023-11-16 PROCEDURE — 99239 HOSP IP/OBS DSCHRG MGMT >30: CPT | Performed by: STUDENT IN AN ORGANIZED HEALTH CARE EDUCATION/TRAINING PROGRAM

## 2023-11-16 PROCEDURE — 97165 OT EVAL LOW COMPLEX 30 MIN: CPT | Mod: GO

## 2023-11-16 PROCEDURE — 2500000004 HC RX 250 GENERAL PHARMACY W/ HCPCS (ALT 636 FOR OP/ED): Performed by: STUDENT IN AN ORGANIZED HEALTH CARE EDUCATION/TRAINING PROGRAM

## 2023-11-16 RX ORDER — VALSARTAN 160 MG/1
320 TABLET ORAL DAILY
Status: DISCONTINUED | OUTPATIENT
Start: 2023-11-16 | End: 2023-11-16 | Stop reason: HOSPADM

## 2023-11-16 RX ORDER — AMLODIPINE BESYLATE 10 MG/1
10 TABLET ORAL DAILY
Status: DISCONTINUED | OUTPATIENT
Start: 2023-11-16 | End: 2023-11-16 | Stop reason: HOSPADM

## 2023-11-16 RX ORDER — CITALOPRAM 20 MG/1
10 TABLET, FILM COATED ORAL EVERY EVENING
Status: DISCONTINUED | OUTPATIENT
Start: 2023-11-16 | End: 2023-11-16

## 2023-11-16 RX ORDER — HYDROCODONE BITARTRATE AND ACETAMINOPHEN 5; 300 MG/1; MG/1
1 TABLET ORAL EVERY 6 HOURS PRN
Status: DISCONTINUED | OUTPATIENT
Start: 2023-11-16 | End: 2023-11-16

## 2023-11-16 RX ORDER — HYDROCODONE BITARTRATE AND ACETAMINOPHEN 5; 325 MG/1; MG/1
1 TABLET ORAL EVERY 6 HOURS PRN
Status: DISCONTINUED | OUTPATIENT
Start: 2023-11-16 | End: 2023-11-16 | Stop reason: HOSPADM

## 2023-11-16 RX ORDER — ATORVASTATIN CALCIUM 40 MG/1
40 TABLET, FILM COATED ORAL DAILY
Status: DISCONTINUED | OUTPATIENT
Start: 2023-11-16 | End: 2023-11-16 | Stop reason: HOSPADM

## 2023-11-16 RX ORDER — HYDROCODONE BITARTRATE AND ACETAMINOPHEN 5; 325 MG/1; MG/1
1 TABLET ORAL EVERY 6 HOURS PRN
Qty: 20 TABLET | Refills: 0 | Status: SHIPPED | OUTPATIENT
Start: 2023-11-16 | End: 2024-01-24 | Stop reason: WASHOUT

## 2023-11-16 RX ORDER — HYDROCHLOROTHIAZIDE 12.5 MG/1
12.5 TABLET ORAL DAILY
Status: DISCONTINUED | OUTPATIENT
Start: 2023-11-16 | End: 2023-11-16 | Stop reason: HOSPADM

## 2023-11-16 RX ORDER — ACETAMINOPHEN 325 MG/1
650 TABLET ORAL EVERY 4 HOURS
Status: DISCONTINUED | OUTPATIENT
Start: 2023-11-16 | End: 2023-11-16 | Stop reason: HOSPADM

## 2023-11-16 RX ORDER — ALLOPURINOL 300 MG/1
300 TABLET ORAL DAILY
Status: DISCONTINUED | OUTPATIENT
Start: 2023-11-16 | End: 2023-11-16 | Stop reason: HOSPADM

## 2023-11-16 RX ORDER — CARVEDILOL 6.25 MG/1
6.25 TABLET ORAL
Status: DISCONTINUED | OUTPATIENT
Start: 2023-11-16 | End: 2023-11-16 | Stop reason: HOSPADM

## 2023-11-16 RX ORDER — ONDANSETRON 4 MG/1
4 TABLET, FILM COATED ORAL EVERY 8 HOURS PRN
Qty: 20 TABLET | Refills: 0 | Status: SHIPPED | OUTPATIENT
Start: 2023-11-16 | End: 2024-01-24 | Stop reason: WASHOUT

## 2023-11-16 RX ADMIN — CARVEDILOL 6.25 MG: 6.25 TABLET, FILM COATED ORAL at 10:18

## 2023-11-16 RX ADMIN — HYDROCODONE BITARTRATE AND ACETAMINOPHEN 1 TABLET: 5; 325 TABLET ORAL at 12:17

## 2023-11-16 RX ADMIN — AMLODIPINE BESYLATE 10 MG: 10 TABLET ORAL at 10:18

## 2023-11-16 RX ADMIN — ACETAMINOPHEN 650 MG: 325 TABLET ORAL at 07:00

## 2023-11-16 RX ADMIN — ASPIRIN 81 MG: 81 TABLET, COATED ORAL at 10:18

## 2023-11-16 RX ADMIN — ALLOPURINOL 300 MG: 300 TABLET ORAL at 10:18

## 2023-11-16 RX ADMIN — VALSARTAN 320 MG: 160 TABLET, FILM COATED ORAL at 10:18

## 2023-11-16 RX ADMIN — HYDROCHLOROTHIAZIDE 12.5 MG: 12.5 TABLET ORAL at 10:18

## 2023-11-16 RX ADMIN — ATORVASTATIN CALCIUM 40 MG: 40 TABLET, FILM COATED ORAL at 10:18

## 2023-11-16 RX ADMIN — DOCUSATE SODIUM 100 MG: 100 CAPSULE, LIQUID FILLED ORAL at 10:18

## 2023-11-16 ASSESSMENT — COGNITIVE AND FUNCTIONAL STATUS - GENERAL
DRESSING REGULAR UPPER BODY CLOTHING: A LITTLE
DAILY ACTIVITIY SCORE: 23

## 2023-11-16 ASSESSMENT — ACTIVITIES OF DAILY LIVING (ADL)
LACK_OF_TRANSPORTATION: NO
HOME_MANAGEMENT_TIME_ENTRY: 10
ADL_ASSISTANCE: INDEPENDENT

## 2023-11-16 ASSESSMENT — PAIN SCALES - GENERAL
PAINLEVEL_OUTOF10: 3
PAINLEVEL_OUTOF10: 7
PAINLEVEL_OUTOF10: 4
PAINLEVEL_OUTOF10: 0 - NO PAIN

## 2023-11-16 ASSESSMENT — PAIN - FUNCTIONAL ASSESSMENT
PAIN_FUNCTIONAL_ASSESSMENT: 0-10
PAIN_FUNCTIONAL_ASSESSMENT: 0-10

## 2023-11-16 NOTE — PROGRESS NOTES
11/16/23 1143   Discharge Planning   Living Arrangements Spouse/significant other   Support Systems Spouse/significant other   Assistance Needed INDEPENDENT AT HOME   Type of Residence Private residence   Number of Stairs to Enter Residence 2   Number of Stairs Within Residence 8   Do you have animals or pets at home? No   Who is requesting discharge planning? Provider   Home or Post Acute Services None   Patient expects to be discharged to: HOME WITH WIFE NO NEEDS. WILL START OUTPATIENT PT OT IN 4-6 WEEKS   Does the patient need discharge transport arranged? Yes   RoundTrip coordination needed? Yes   Has discharge transport been arranged? No   Financial Resource Strain   How hard is it for you to pay for the very basics like food, housing, medical care, and heating? Not hard   Housing Stability   In the last 12 months, was there a time when you were not able to pay the mortgage or rent on time? N   In the last 12 months, was there a time when you did not have a steady place to sleep or slept in a shelter (including now)? N   Transportation Needs   In the past 12 months, has lack of transportation kept you from medical appointments or from getting medications? no   In the past 12 months, has lack of transportation kept you from meetings, work, or from getting things needed for daily living? No     Care Coordinator Note:  TCC spoke with patient regarding dc planning. Patient lives at home independently with wife. Has Cpap at home. PCP bridget reagan seen 1 week ago.     Plan: patient POD#1 planned revision of right shoulder repair. Will remain in sling in 4-6 weeks  Status: ext rec  Payor: Medicare A/B  Disposition: HNN.  Will follow up with ortho and plan for outpatient PT OT 4-6 weeks from now.   Barrier: none  ADOD: today    Catherine Ybarra [facility]

## 2023-11-16 NOTE — DISCHARGE SUMMARY
Discharge Diagnosis  Dislocation, shoulder closed, right, initial encounter    Issues Requiring Follow-Up  S/P right reverse total shoulder replacement    Test Results Pending At Discharge  Pending Labs       Order Current Status    Fungal Culture/Smear Preliminary result    Fungal Culture/Smear Preliminary result    Fungal Culture/Smear Preliminary result    Fungal Culture/Smear Preliminary result    Fungal Culture/Smear Preliminary result    Fungal Culture/Smear Preliminary result    Tissue/Wound Culture/Smear Preliminary result    Tissue/Wound Culture/Smear Preliminary result    Tissue/Wound Culture/Smear Preliminary result    Tissue/Wound Culture/Smear Preliminary result    Tissue/Wound Culture/Smear Preliminary result    Tissue/Wound Culture/Smear Preliminary result            Hospital Course  Patient is a 73M who presented after right shoulder periprosthetic dislocation. They are now s/p revision right shoulder reverse on 11/15/23 by Dr. Zavala. Following the surgery the patient recovered in the PACU prior to being transferred to the regular nursing floor.  The patient was provided with oxycodone for moderate and severe pain and dilaudid for breakthrough pain as needed. Given ASA 81mg daily for DVT prophylaxis. The patient worked with physical therapy/ occupational therapy who recommended continued therapy. On the day of discharge, the patient's vital signs were stable. They were provided with prescriptions for pain control and aspirin 81mg to take daily x 14 days. They should follow up with Dr. Zavala in 3 weeks.      Vitals:    11/15/23 1959 11/16/23 0007 11/16/23 0314 11/16/23 0847   BP: 156/71 128/67 134/55 129/56   BP Location:    Left arm   Patient Position:    Sitting   Pulse: 51 (!) 49 (!) 47 59   Resp: 16 18 15    Temp: 36.3 °C (97.3 °F) 35.9 °C (96.6 °F) 36 °C (96.8 °F) 37.1 °C (98.8 °F)   TempSrc:    Temporal   SpO2: 96% 92% 94% 98%   Weight:       Height:             Pertinent Physical Exam  At Time of Discharge  PE:  Constitutional: A&Ox3, calm and cooperative, NAD  Eyes: EOMI, clear sclera   Cardiovascular: Normal rate and regular rhythm. No murmurs  Respiratory/Thorax: CTAB, on RA  Genitourinary: Voiding independently   Musculoskeletal: RUE dressing CDI, sling in place. fires AIN/PIN/U. SILT M/R/U. 2+ R pulse. <2 CRT. Compartments soft, compressible. HV in place with sanguinous output- 35 ml since OR. Drain pulled by this PA without difficulty.   Extremities: No peripheral edema, contusions, or wounds  Neurological: A&Ox3, No focal deficits   Psychological: Appropriate mood and behavior     Home Medications     Medication List      START taking these medications     aspirin 81 mg capsule   docusate sodium 100 mg capsule; Commonly known as: Colace; Take 2   capsules (200 mg) by mouth 2 times a day for 7 days.   HYDROcodone-acetaminophen 5-325 mg tablet; Commonly known as: Norco;   Take 1 tablet by mouth every 6 hours if needed for severe pain (7 - 10).   ondansetron 4 mg tablet; Commonly known as: Zofran; Take 1 tablet (4 mg)   by mouth every 8 hours if needed for nausea.     CONTINUE taking these medications     allopurinol 300 mg tablet; Commonly known as: Zyloprim; TAKE 1 TABLET BY   MOUTH DAILY FOR GOUT PREVENTION   amLODIPine 10 mg tablet; Commonly known as: Norvasc; TAKE 1 TABLET BY   MOUTH EVERY DAY FOR BLOOD PRESSURE   ascorbic acid (vitamin C) 500 mg capsule   atorvastatin 80 mg tablet; Commonly known as: Lipitor; TAKE 1/2 TABLET   ONCE DAILY   carvedilol 6.25 mg tablet; Commonly known as: Coreg   cholecalciferol 125 MCG (5000 UT) capsule; Commonly known as: Vitamin   D-3   citalopram 10 mg tablet; Commonly known as: CeleXA   coenzyme Q-10 100 mg capsule   cyanocobalamin 1,000 mcg tablet; Commonly known as: Vitamin B-12   hydroCHLOROthiazide 12.5 mg tablet; Commonly known as: HYDRODiuril; TAKE   1 TABLET BY MOUTH EVERY DAY   sildenafil 50 mg tablet; Commonly known as: Viagra; Take 1 tablet  daily   as needed/directed.  Good Rx please   valsartan 320 mg tablet; Commonly known as: Diovan; TAKE 1 TABLET BY   MOUTH EVERY DAY   ZINC ORAL     STOP taking these medications     ondansetron ODT 4 mg disintegrating tablet; Commonly known as:   Zofran-ODT       Outpatient Follow-Up  Future Appointments   Date Time Provider Department [place]   12/5/2023  4:00 PM Ronda Naranjo APRN-CNP [id number] [facility]   5/16/2024  8:30 AM Demetrio Morejon MD [id number] [facility]       Maura Muller PA-C

## 2023-11-16 NOTE — PROGRESS NOTES
Occupational Therapy    Evaluation/Treatment    Patient Name: Cam Hernandez  MRN: [id number]  : 1950  Today's Date: 23  Time Calculation  Start Time: 928  Stop Time: 955  Time Calculation (min): 27 min       Assessment:  OT Assessment: pt is s/p Right shoulder replacement revision and demonstrated good carry over of precautions and techniques with good safety awareness.  pt has wife who can assist as needed and has assisted with previous shoulder surgeries.  no further OT intervention is indicated at this time.  Prognosis: Excellent  Barriers to Discharge: None  Evaluation/Treatment Tolerance: Patient tolerated treatment well  Medical Staff Made Aware: Yes  End of Session Communication: Bedside nurse  End of Session Patient Position:  (seated edge of bed)  OT Assessment Results: Non-functional right upper extremity, Decreased ADL status  Prognosis: Excellent  Barriers to Discharge: None  Evaluation/Treatment Tolerance: Patient tolerated treatment well  Medical Staff Made Aware: Yes  Plan:  No Skilled OT: No acute OT goals identified  OT Discharge Recommendations: No OT needed after discharge  OT Recommended Transfer Status: Independent  OT - OK to Discharge: Yes         General:   OT Received On: 23  General  Reason for Referral: 73 yr old male POD 1 of Right Shoulder Replacement Revision  Referred By: Ronda Naranjo  Past Medical History Relevant to Rehab:   Past Medical History:   Diagnosis Date    Acute kidney failure, unspecified (CMS/HCC) 2021    GUILLERMO (acute kidney injury)    Erectile dysfunction     Essential (primary) hypertension 2022    Hypertension    Familial hypercholesterolemia     Essential familial hypercholesterolemia    Hemoptysis     Coughing up blood    Other conditions influencing health status     Single Vessel Coronary Artery Stenosis    Other conditions influencing health status     Peptic Ulcer    Other symptoms and signs involving the genitourinary system  03/23/2016    Abnormal urine color    Periodic breathing 10/28/2014     (Cheyne Bishop respiration)    Personal history of other diseases of the circulatory system     History of essential hypertension    Personal history of other diseases of the musculoskeletal system and connective tissue 03/23/2016    History of backache    Personal history of other diseases of the musculoskeletal system and connective tissue 03/23/2016    History of back pain    Personal history of other diseases of the musculoskeletal system and connective tissue     History of gout    Personal history of other diseases of urinary system 10/09/2015    History of hematuria    Personal history of other diseases of urinary system 11/10/2014    History of hematuria    Personal history of other specified conditions 11/05/2014    History of urinary frequency    PONV (postoperative nausea and vomiting)     Primary central sleep apnea     Primary central sleep apnea    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    Rhinophyma    Sleep related hypoventilation in conditions classified elsewhere 02/20/2015    Sleep-related hypoventilation due to medical condition    Tinea pedis 03/10/2015    Tinea pedis of left foot    Tinea unguium 03/10/2015    Onychomycosis of toenail    Unspecified blepharitis unspecified eye, unspecified eyelid 03/12/2013    Blepharitis       Family/Caregiver Present: Yes  Caregiver Feedback: wife present for session  Prior to Session Communication: Bedside nurse  Patient Position Received: Bed, 2 rail up  Preferred Learning Style: auditory, kinesthetic  General Comment: pt supine in bed upon arrival, cooperative and receptive.  pt reporting multiple shoulder surgeries in the past year and is familiar with precautions, exercises and techniques however would like a refresher/review.  Precautions:  UE Weight Bearing Status: Right Non-Weight Bearing  Post-Surgical Precautions: Right shoulder precautions  Precautions Comment: PROM shoulder of shoulder  flexion and 30 degrees external rotation.  Sling at all times except for dressing/bathing and UE exercises.  distal ROM Permitted.     Pain:  Pain Assessment  Pain Assessment: 0-10  Pain Score: 4  Pain Type: Surgical pain  Pain Location: Shoulder  Pain Orientation: Right    Objective   Cognition:  Overall Cognitive Status: Within Functional Limits  Orientation Level: Oriented X4           Home Living:  Type of Home: House  Lives With: Spouse  Home Adaptive Equipment: None  Home Layout: Two level  Home Access: Stairs to enter without rails  Entrance Stairs-Rails: None  Entrance Stairs-Number of Steps: 2  Bathroom Shower/Tub: Walk-in shower  Bathroom Toilet: Handicapped height  Bathroom Equipment: Shower chair with back  Prior Function:  Level of [place]: Independent with ADLs and functional transfers  Receives Help From:  (wife)  ADL Assistance: Independent  Ambulatory Assistance: Independent (no device)  Prior Function Comments: Pt with multiple shoulder surgies in the past, wife assisted as needed however pt mostly independent with ADLs     ADL:  UE Dressing Assistance: Minimal (pt was able to don t-shirt while seated via dennise-dressing technique with good carry over of technique.  required MIN A for donning sling for proper fit)  LE Dressing Assistance: Independent  LE Dressing Deficit:  (pt was able to don underwear/pants via one handed technique at independent level.  pt with good carry over of techniques.)  Activities of Daily Living: UE Dressing  UE Dressing Comments: pt educated on one-handed dressing technique and demonstrated good carry over.  pt educated on donning/doffing sling with proper positioning.  this therpapist made adjustments to sling straps for proper fit and greater comfort.       Activity Tolerance:  Endurance: Endurance does not limit participation in activity  Functional Standing Tolerance:     Bed Mobility/Transfers: Bed Mobility  Bed Mobility: Yes  Bed Mobility 1  Bed Mobility 1:  Supine to sitting  Level of Assistance 1: Independent  Bed Mobility Comments 1: good carry over of NWB RUE    Transfers  Transfer: Yes  Transfer 1  Technique 1: Sit to stand, Stand to sit  Transfer Level of Assistance 1: Independent  Transfers 2  Technique 2: Stand pivot  Transfer Level of Assistance 2: Independent      Ambulation/Gait Training:  Ambulation/Gait Training  Ambulation/Gait Training Performed: Yes  Ambulation/Gait Training 1  Surface 1: Level tile  Device 1: No device  Assistance 1: Independent  Comments/Distance (ft) 1: pt performing functional mobility within hospital room without a device at independent level  Sitting Balance:  Static Sitting Balance  Static Sitting-Balance Support: Feet supported  Static Sitting-Level of Assistance: Independent  Standing Balance:  Static Standing Balance  Static Standing-Balance Support: No upper extremity supported  Static Standing-Level of Assistance: Independent       Therapy/Activity: Therapeutic Exercise  Therapeutic Exercise Performed: Yes  Therapeutic Exercise Activity 1: pt performed PROM shoulder flexion to 90 degress and external rotation to 30 degrees x10 reps.  pt also performed elbow flex/ext, pronation/supination, wrist flex/ext, and digit flex/ext x10 reps.     Vision:Vision - Basic Assessment  Current Vision: No visual deficits     Strength:  Strength Comments: LUE WFL, RUE not tested due to NWB     Coordination:  Movements are Fluid and Coordinated: Yes   Hand Function:  Hand Function  Gross Grasp: Functional  Coordination: Functional  Extremities: RUE   RUE :  (Distal ROM WFL, shoulder not tested due to PROM only) and LUE   LUE: Within Functional Limits      Outcome Measures: [facility] Daily Activity  Putting on and taking off regular lower body clothing: None  Bathing (including washing, rinsing, drying): None  Putting on and taking off regular upper body clothing: A little  Toileting, which includes using toilet, bedpan or urinal: None  Taking care  of personal grooming such as brushing teeth: None  Eating Meals: None  Daily Activity - Total Score: 23        Education Documentation  Handouts, taught by Patrick Chin OT at 11/16/2023 10:26 AM.  Learner: Significant Other, Patient  Readiness: Eager  Method: Explanation, Demonstration, Handout  Response: Verbalizes Understanding, Demonstrated Understanding    Precautions, taught by Patrick Chin OT at 11/16/2023 10:26 AM.  Learner: Significant Other, Patient  Readiness: Eager  Method: Explanation, Demonstration, Handout  Response: Verbalizes Understanding, Demonstrated Understanding    Home Exercise Program, taught by Patrick Chin OT at 11/16/2023 10:26 AM.  Learner: Significant Other, Patient  Readiness: Eager  Method: Explanation, Demonstration, Handout  Response: Verbalizes Understanding, Demonstrated Understanding    ADL Training, taught by Patrick Chin OT at 11/16/2023 10:26 AM.  Learner: Significant Other, Patient  Readiness: Eager  Method: Explanation, Demonstration, Handout  Response: Verbalizes Understanding, Demonstrated Understanding    Education Comments  No comments found.

## 2023-11-16 NOTE — NURSING NOTE
Interdisciplinary team present: NP, PT, NM, CC, SW, Orthopedic Coordinator, and bedside RN.  Pain - controlled  Nausea - none  Discharge barrier - none  Discharge plan - home  Discharge date/time -  11/16/23

## 2023-11-16 NOTE — CARE PLAN
The patient's goals for the shift include    Problem: Fall/Injury  Goal: Not fall by end of shift  Outcome: Met  Goal: Be free from injury by end of the shift  Outcome: Met  Goal: Verbalize understanding of personal risk factors for fall in the hospital  Outcome: Met  Goal: Verbalize understanding of risk factor reduction measures to prevent injury from fall in the home  Outcome: Met  Goal: Use assistive devices by end of the shift  Outcome: Met  Goal: Pace activities to prevent fatigue by end of the shift  Outcome: Met     Problem: Pain  Goal: My pain/discomfort is manageable  Outcome: Met     Problem: Safety  Goal: Patient will be injury free during hospitalization  Outcome: Met  Goal: I will remain free of falls  Outcome: Met     Problem: Daily Care  Goal: Daily care needs are met  Outcome: Met     Problem: Psychosocial Needs  Goal: Demonstrates ability to cope with hospitalization/illness  Outcome: Met  Goal: Collaborate with me, my family, and caregiver to identify my specific goals  Outcome: Met     Problem: Discharge Barriers  Goal: My discharge needs are met  Outcome: Met       The clinical goals for the shift include pain management

## 2023-11-16 NOTE — CARE PLAN
Problem: Fall/Injury  Goal: Not fall by end of shift  Outcome: Progressing  Goal: Be free from injury by end of the shift  Outcome: Progressing  Goal: Verbalize understanding of personal risk factors for fall in the hospital  Outcome: Progressing  Goal: Verbalize understanding of risk factor reduction measures to prevent injury from fall in the home  Outcome: Progressing  Goal: Use assistive devices by end of the shift  Outcome: Progressing  Goal: Pace activities to prevent fatigue by end of the shift  Outcome: Progressing     Problem: Pain  Goal: My pain/discomfort is manageable  Outcome: Progressing     Problem: Safety  Goal: Patient will be injury free during hospitalization  Outcome: Progressing  Goal: I will remain free of falls  Outcome: Progressing     Problem: Daily Care  Goal: Daily care needs are met  Outcome: Progressing     Problem: Psychosocial Needs  Goal: Demonstrates ability to cope with hospitalization/illness  Outcome: Progressing  Goal: Collaborate with me, my family, and caregiver to identify my specific goals  Outcome: Progressing  Flowsheets (Taken 11/15/2023 1911)  Cultural Requests During Hospitalization: none  Spiritual Requests During Hospitalization: none     Problem: Discharge Barriers  Goal: My discharge needs are met  Outcome: Progressing

## 2023-11-21 LAB
BACTERIA SPEC CULT: NORMAL
GRAM STN SPEC: NORMAL
GRAM STN SPEC: NORMAL

## 2023-11-23 LAB
BACTERIA SPEC CULT: NORMAL
GRAM STN SPEC: NORMAL

## 2023-12-04 LAB
FUNGUS SPEC CULT: NORMAL
FUNGUS SPEC FUNGUS STN: NORMAL

## 2023-12-05 ENCOUNTER — OFFICE VISIT (OUTPATIENT)
Dept: ORTHOPEDIC SURGERY | Facility: HOSPITAL | Age: 73
End: 2023-12-05
Payer: MEDICARE

## 2023-12-05 ENCOUNTER — HOSPITAL ENCOUNTER (OUTPATIENT)
Dept: RADIOLOGY | Facility: HOSPITAL | Age: 73
Discharge: HOME | End: 2023-12-05
Payer: MEDICARE

## 2023-12-05 VITALS — HEIGHT: 68 IN | WEIGHT: 220 LBS | BODY MASS INDEX: 33.34 KG/M2

## 2023-12-05 DIAGNOSIS — Z47.1 AFTERCARE FOLLOWING JOINT REPLACEMENT SURGERY, UNSPECIFIED JOINT: ICD-10-CM

## 2023-12-05 DIAGNOSIS — Z96.619 DISLOCATION OF PROSTHETIC JOINT OF SHOULDER, INITIAL ENCOUNTER (CMS-HCC): ICD-10-CM

## 2023-12-05 DIAGNOSIS — Z47.1 AFTERCARE FOLLOWING JOINT REPLACEMENT SURGERY, UNSPECIFIED JOINT: Primary | ICD-10-CM

## 2023-12-05 DIAGNOSIS — T84.028A DISLOCATION OF PROSTHETIC JOINT OF SHOULDER, INITIAL ENCOUNTER (CMS-HCC): ICD-10-CM

## 2023-12-05 PROCEDURE — 1125F AMNT PAIN NOTED PAIN PRSNT: CPT | Performed by: NURSE PRACTITIONER

## 2023-12-05 PROCEDURE — 73030 X-RAY EXAM OF SHOULDER: CPT | Mod: RIGHT SIDE | Performed by: RADIOLOGY

## 2023-12-05 PROCEDURE — 3008F BODY MASS INDEX DOCD: CPT | Performed by: NURSE PRACTITIONER

## 2023-12-05 PROCEDURE — 99024 POSTOP FOLLOW-UP VISIT: CPT | Performed by: NURSE PRACTITIONER

## 2023-12-05 PROCEDURE — 1036F TOBACCO NON-USER: CPT | Performed by: NURSE PRACTITIONER

## 2023-12-05 PROCEDURE — 73030 X-RAY EXAM OF SHOULDER: CPT | Mod: RT,FY

## 2023-12-05 PROCEDURE — 1111F DSCHRG MED/CURRENT MED MERGE: CPT | Performed by: NURSE PRACTITIONER

## 2023-12-05 PROCEDURE — 1159F MED LIST DOCD IN RCRD: CPT | Performed by: NURSE PRACTITIONER

## 2023-12-05 PROCEDURE — 1160F RVW MEDS BY RX/DR IN RCRD: CPT | Performed by: NURSE PRACTITIONER

## 2023-12-05 ASSESSMENT — PAIN - FUNCTIONAL ASSESSMENT: PAIN_FUNCTIONAL_ASSESSMENT: NO/DENIES PAIN

## 2023-12-05 NOTE — PROGRESS NOTES
Provider Impression/Assessment:  Cam Hernandez is a pleasant 73 y.o. male who is accompanied by his wife today. Returning to clinic for their first postoperative visit. They are 3 weeks status post right revision reverse total shoulder replacement, found to be dislocated today. Final results of intraoperative cultures came back as negative, showing no growth aerobically or anaerobically. Results discussed with patient today.    Patient Discussion/Plan:  Cam Hernandez is doing well since surgery. For the next 6 weeks, no lifting more than a coffee cup. Keep your hand in front of you at all times. No opening or closing doors. We emphasized scapular stabilizers and they are instructed on how to do them. Continue doing active range of motion of elbow and wrist and hand daily for the next 6 weeks. No lifting more than a coffee cup.     At this point, as he has unfortunately dislocated his shoulder again we will reach out to our reps to organize getting a bigger custom  glenoid head made prior to another revision for better stability. This plan will likely take 6-8 weeks once approved. Dr Zavala will reach out to the patient and his family tomorrow for further discussion of the plan moving forward. In the meantime, patient is encouraged to wear the sling to help protect his arm while awake and asleep as this point. Continue with restrictions. Patient will let us know if any changes with his arm prior to final discussion of revision to take place.     All questions were answered today to their satisfaction and they know how to reach our office as needed prior to the next scheduled visit with our office. Patient was discussed with Dr Zavala and he agrees with the above plan.    Should you have any questions or concerns after your visit today, please do not hesitate to call the office at [phone]. I am honored to be a provider on your health care team and remain dedicated to helping you achieve your healthcare  goals    -------------------------------------------------------------------------------------------------  CHIEF COMPLAINT:  POV RIGHT REVISION REVERSE REPLACEMENT  DOS: 11/15/23.    History of Present Illness:  Cam Hernandez returns to clinic for his first postoperative visit. He is 3 weeks S/P Right Shoulder Replacement Revision, done 11/15/23. XR done today. Sutures removed today. Cam reports minimal pain since surgery. He discontinued pain medication the day after surgery. He reports some clicking, popping, and occasional paresthesia into his right hand. He has not been wearing his sling and he does not have it on today in clinic. No other issues or concerns to report.     He reports doing well after surgery. Pain is well managed. Denies use of narcotic pain medication at this time.  Over-the-counter pain medication at this time. Sleeping without much difficulty. Denies redness or warmth at incision site. Denies any fever, chills, or paresthesia. Doing well with daily home therapy for passive shoulder exercises and active elbow, wrist and hand exercises.     Review of Systems:   Review of systems for all 14 systems is negative for complaint except for the above noted findings in the history of present illness.    Family, social, and medical histories are obtained and reviewed.    Allergies:  Allergies   Allergen Reactions    Pollen Extracts Hives       Medications:    Current Outpatient Medications:     allopurinol (Zyloprim) 300 mg tablet, TAKE 1 TABLET BY MOUTH DAILY FOR GOUT PREVENTION, Disp: 90 tablet, Rfl: 3    amLODIPine (Norvasc) 10 mg tablet, TAKE 1 TABLET BY MOUTH EVERY DAY FOR BLOOD PRESSURE, Disp: 90 tablet, Rfl: 3    ascorbic acid, vitamin C, 500 mg capsule, Take by mouth., Disp: , Rfl:     aspirin 81 mg capsule, Take 1 tablet by mouth once daily., Disp: , Rfl:     atorvastatin (Lipitor) 80 mg tablet, TAKE 1/2 TABLET ONCE DAILY, Disp: 45 tablet, Rfl: 3    carvedilol (Coreg) 6.25 mg tablet, Take  1 tablet (6.25 mg) by mouth. Take with food., Disp: , Rfl:     cholecalciferol (Vitamin D-3) 125 MCG (5000 UT) capsule, Take by mouth., Disp: , Rfl:     citalopram (CeleXA) 10 mg tablet, Take 1 tablet (10 mg) by mouth once daily in the evening., Disp: , Rfl:     coenzyme Q-10 100 mg capsule, Take by mouth., Disp: , Rfl:     cyanocobalamin (Vitamin B-12) 1,000 mcg tablet, Take by mouth every other day., Disp: , Rfl:     hydroCHLOROthiazide (HYDRODiuril) 12.5 mg tablet, TAKE 1 TABLET BY MOUTH EVERY DAY, Disp: 90 tablet, Rfl: 3    HYDROcodone-acetaminophen (Norco) 5-325 mg tablet, Take 1 tablet by mouth every 6 hours if needed for severe pain (7 - 10). (Patient not taking: Reported on 12/5/2023), Disp: 20 tablet, Rfl: 0    ondansetron (Zofran) 4 mg tablet, Take 1 tablet (4 mg) by mouth every 8 hours if needed for nausea. (Patient not taking: Reported on 12/5/2023), Disp: 20 tablet, Rfl: 0    sildenafil (Viagra) 50 mg tablet, Take 1 tablet daily as needed/directed.  Good Rx please, Disp: 30 tablet, Rfl: 1    valsartan (Diovan) 320 mg tablet, TAKE 1 TABLET BY MOUTH EVERY DAY, Disp: 90 tablet, Rfl: 3    ZINC ORAL, Take 1 tablet by mouth once daily., Disp: , Rfl:     Diagnoses/Problems:  Right shoulder arthritis    Physical Examination:  Patient shoulder incision is dry, intact and healing well. Very minimal swelling. Mild ecchymosis. No erythema or warmth. Nonabsorbable suture was removed today. Surgical glue intact. Neurovascularly intact distally. 5 out of 5 wrist, hand and thumb flexion and extension without pain. Full range of motion of elbow. They can passively elevate to about 90° of forward elevation without pain.     Results/Imaging:  Independent review of the imaging today of right shoulder show subluxation and dislocation of reverse shoulder prosthesis. No evidence of fracture or other acute findings. I personally spent time viewing digital images of the radiology testing with the patient. Radiology results  discussed with Dr. Zavala.    *While intending to generate a timely document that accurately reflects the content of the visit, no guarantee can be provided that every grammatical or spelling mistake has been or will be identified or corrected. Thank you for your understanding.

## 2024-01-05 DIAGNOSIS — M19.011 ARTHRITIS OF RIGHT SHOULDER REGION: Primary | ICD-10-CM

## 2024-01-24 ENCOUNTER — TELEMEDICINE CLINICAL SUPPORT (OUTPATIENT)
Dept: PREADMISSION TESTING | Facility: HOSPITAL | Age: 74
End: 2024-01-24
Payer: MEDICARE

## 2024-01-24 DIAGNOSIS — M19.011 ARTHRITIS OF RIGHT SHOULDER REGION: ICD-10-CM

## 2024-01-24 RX ORDER — CIPROFLOXACIN 500 MG/1
500 TABLET ORAL 2 TIMES DAILY
COMMUNITY
Start: 2024-01-04 | End: 2024-01-24 | Stop reason: WASHOUT

## 2024-01-24 RX ORDER — FINASTERIDE 1 MG/1
1 TABLET, FILM COATED ORAL DAILY
COMMUNITY
Start: 2024-01-19 | End: 2024-05-16 | Stop reason: WASHOUT

## 2024-01-30 ENCOUNTER — HOSPITAL ENCOUNTER (OUTPATIENT)
Dept: RADIOLOGY | Facility: HOSPITAL | Age: 74
Discharge: HOME | End: 2024-01-30
Payer: MEDICARE

## 2024-01-30 ENCOUNTER — PRE-ADMISSION TESTING (OUTPATIENT)
Dept: PREADMISSION TESTING | Facility: HOSPITAL | Age: 74
End: 2024-01-30
Payer: MEDICARE

## 2024-01-30 ENCOUNTER — LAB (OUTPATIENT)
Dept: LAB | Facility: LAB | Age: 74
End: 2024-01-30
Payer: MEDICARE

## 2024-01-30 ENCOUNTER — OFFICE VISIT (OUTPATIENT)
Dept: ORTHOPEDIC SURGERY | Facility: HOSPITAL | Age: 74
End: 2024-01-30
Payer: MEDICARE

## 2024-01-30 ENCOUNTER — APPOINTMENT (OUTPATIENT)
Dept: ORTHOPEDIC SURGERY | Facility: HOSPITAL | Age: 74
End: 2024-01-30
Payer: MEDICARE

## 2024-01-30 VITALS
BODY MASS INDEX: 36.47 KG/M2 | HEIGHT: 67 IN | DIASTOLIC BLOOD PRESSURE: 77 MMHG | WEIGHT: 232.37 LBS | HEART RATE: 65 BPM | OXYGEN SATURATION: 96 % | RESPIRATION RATE: 18 BRPM | TEMPERATURE: 96.4 F | SYSTOLIC BLOOD PRESSURE: 152 MMHG

## 2024-01-30 DIAGNOSIS — M25.512 BILATERAL SHOULDER PAIN, UNSPECIFIED CHRONICITY: ICD-10-CM

## 2024-01-30 DIAGNOSIS — M25.511 BILATERAL SHOULDER PAIN, UNSPECIFIED CHRONICITY: ICD-10-CM

## 2024-01-30 DIAGNOSIS — Z96.611 STATUS POST REVERSE TOTAL SHOULDER REPLACEMENT, RIGHT: Primary | ICD-10-CM

## 2024-01-30 DIAGNOSIS — Z01.818 PREOP EXAMINATION: ICD-10-CM

## 2024-01-30 DIAGNOSIS — Z01.818 PREOP EXAMINATION: Primary | ICD-10-CM

## 2024-01-30 LAB
ABO GROUP (TYPE) IN BLOOD: NORMAL
ANION GAP SERPL CALC-SCNC: 13 MMOL/L (ref 10–20)
ANTIBODY SCREEN: NORMAL
ATRIAL RATE: 60 BPM
BASOPHILS # BLD AUTO: 0.07 X10*3/UL (ref 0–0.1)
BASOPHILS NFR BLD AUTO: 0.9 %
BUN SERPL-MCNC: 17 MG/DL (ref 6–23)
CALCIUM SERPL-MCNC: 9.2 MG/DL (ref 8.6–10.3)
CHLORIDE SERPL-SCNC: 102 MMOL/L (ref 98–107)
CO2 SERPL-SCNC: 27 MMOL/L (ref 21–32)
CREAT SERPL-MCNC: 1.25 MG/DL (ref 0.5–1.3)
EGFRCR SERPLBLD CKD-EPI 2021: 60 ML/MIN/1.73M*2
EOSINOPHIL # BLD AUTO: 0.32 X10*3/UL (ref 0–0.4)
EOSINOPHIL NFR BLD AUTO: 4.1 %
ERYTHROCYTE [DISTWIDTH] IN BLOOD BY AUTOMATED COUNT: 13.4 % (ref 11.5–14.5)
GLUCOSE SERPL-MCNC: 79 MG/DL (ref 74–99)
HCT VFR BLD AUTO: 46.4 % (ref 41–52)
HGB BLD-MCNC: 15.9 G/DL (ref 13.5–17.5)
IMM GRANULOCYTES # BLD AUTO: 0.03 X10*3/UL (ref 0–0.5)
IMM GRANULOCYTES NFR BLD AUTO: 0.4 % (ref 0–0.9)
LYMPHOCYTES # BLD AUTO: 1.14 X10*3/UL (ref 0.8–3)
LYMPHOCYTES NFR BLD AUTO: 14.7 %
MCH RBC QN AUTO: 30.4 PG (ref 26–34)
MCHC RBC AUTO-ENTMCNC: 34.3 G/DL (ref 32–36)
MCV RBC AUTO: 89 FL (ref 80–100)
MONOCYTES # BLD AUTO: 0.76 X10*3/UL (ref 0.05–0.8)
MONOCYTES NFR BLD AUTO: 9.8 %
NEUTROPHILS # BLD AUTO: 5.46 X10*3/UL (ref 1.6–5.5)
NEUTROPHILS NFR BLD AUTO: 70.1 %
NRBC BLD-RTO: 0 /100 WBCS (ref 0–0)
P AXIS: 38 DEGREES
P OFFSET: 157 MS
P ONSET: 88 MS
PLATELET # BLD AUTO: 198 X10*3/UL (ref 150–450)
POTASSIUM SERPL-SCNC: 4 MMOL/L (ref 3.5–5.3)
PR INTERVAL: 252 MS
Q ONSET: 214 MS
QRS COUNT: 10 BEATS
QRS DURATION: 134 MS
QT INTERVAL: 400 MS
QTC CALCULATION(BAZETT): 400 MS
QTC FREDERICIA: 400 MS
R AXIS: -69 DEGREES
RBC # BLD AUTO: 5.23 X10*6/UL (ref 4.5–5.9)
RH FACTOR (ANTIGEN D): NORMAL
SODIUM SERPL-SCNC: 138 MMOL/L (ref 136–145)
T AXIS: 26 DEGREES
T OFFSET: 414 MS
VENTRICULAR RATE: 60 BPM
WBC # BLD AUTO: 7.8 X10*3/UL (ref 4.4–11.3)

## 2024-01-30 PROCEDURE — 80048 BASIC METABOLIC PNL TOTAL CA: CPT

## 2024-01-30 PROCEDURE — 87081 CULTURE SCREEN ONLY: CPT | Mod: AHULAB | Performed by: ORTHOPAEDIC SURGERY

## 2024-01-30 PROCEDURE — 1157F ADVNC CARE PLAN IN RCRD: CPT | Performed by: ORTHOPAEDIC SURGERY

## 2024-01-30 PROCEDURE — 73030 X-RAY EXAM OF SHOULDER: CPT | Mod: 50

## 2024-01-30 PROCEDURE — 93005 ELECTROCARDIOGRAM TRACING: CPT | Performed by: NURSE PRACTITIONER

## 2024-01-30 PROCEDURE — 86900 BLOOD TYPING SEROLOGIC ABO: CPT

## 2024-01-30 PROCEDURE — 86901 BLOOD TYPING SEROLOGIC RH(D): CPT

## 2024-01-30 PROCEDURE — 1159F MED LIST DOCD IN RCRD: CPT | Performed by: ORTHOPAEDIC SURGERY

## 2024-01-30 PROCEDURE — 3008F BODY MASS INDEX DOCD: CPT | Performed by: ORTHOPAEDIC SURGERY

## 2024-01-30 PROCEDURE — 36415 COLL VENOUS BLD VENIPUNCTURE: CPT

## 2024-01-30 PROCEDURE — 1036F TOBACCO NON-USER: CPT | Performed by: ORTHOPAEDIC SURGERY

## 2024-01-30 PROCEDURE — 1125F AMNT PAIN NOTED PAIN PRSNT: CPT | Performed by: ORTHOPAEDIC SURGERY

## 2024-01-30 PROCEDURE — 86850 RBC ANTIBODY SCREEN: CPT

## 2024-01-30 PROCEDURE — 85025 COMPLETE CBC W/AUTO DIFF WBC: CPT

## 2024-01-30 PROCEDURE — 99024 POSTOP FOLLOW-UP VISIT: CPT | Performed by: ORTHOPAEDIC SURGERY

## 2024-01-30 PROCEDURE — 99214 OFFICE O/P EST MOD 30 MIN: CPT | Performed by: NURSE PRACTITIONER

## 2024-01-30 PROCEDURE — 99213 OFFICE O/P EST LOW 20 MIN: CPT | Performed by: ORTHOPAEDIC SURGERY

## 2024-01-30 RX ORDER — CHLORHEXIDINE GLUCONATE ORAL RINSE 1.2 MG/ML
15 SOLUTION DENTAL 2 TIMES DAILY
Qty: 473 ML | Refills: 0 | Status: SHIPPED | OUTPATIENT
Start: 2024-01-30 | End: 2024-02-08 | Stop reason: HOSPADM

## 2024-01-30 ASSESSMENT — ENCOUNTER SYMPTOMS
CARDIOVASCULAR NEGATIVE: 1
NECK NEGATIVE: 1
NEUROLOGICAL NEGATIVE: 1
ENDOCRINE NEGATIVE: 1
RESPIRATORY NEGATIVE: 1
GASTROINTESTINAL NEGATIVE: 1
EYES NEGATIVE: 1

## 2024-01-30 NOTE — PREPROCEDURE INSTRUCTIONS
Medication List            Accurate as of January 30, 2024  1:10 PM. Always use your most recent med list.                allopurinol 300 mg tablet  Commonly known as: Zyloprim  TAKE 1 TABLET BY MOUTH DAILY FOR GOUT PREVENTION  Medication Adjustments for Surgery: Take morning of surgery with sip of water, no other fluids     amLODIPine 10 mg tablet  Commonly known as: Norvasc  TAKE 1 TABLET BY MOUTH EVERY DAY FOR BLOOD PRESSURE  Medication Adjustments for Surgery: Take morning of surgery with sip of water, no other fluids     ascorbic acid (vitamin C) 500 mg capsule  Medication Adjustments for Surgery: Stop 7 days before surgery     aspirin 81 mg capsule  Medication Adjustments for Surgery: Take morning of surgery with sip of water, no other fluids     atorvastatin 80 mg tablet  Commonly known as: Lipitor  TAKE 1/2 TABLET ONCE DAILY  Medication Adjustments for Surgery: Take morning of surgery with sip of water, no other fluids     carvedilol 6.25 mg tablet  Commonly known as: Coreg  Medication Adjustments for Surgery: Take morning of surgery with sip of water, no other fluids     chlorhexidine 0.12 % solution  Commonly known as: Peridex  Use 15 mL in the mouth or throat 2 times a day. Use night before surgery and morning of surgery Swish and spit  Medication Adjustments for Surgery: Take morning of surgery with sip of water, no other fluids     cholecalciferol 125 MCG (5000 UT) capsule  Commonly known as: Vitamin D-3  Medication Adjustments for Surgery: Continue until night before surgery     coenzyme Q-10 100 mg capsule     cyanocobalamin 1,000 mcg tablet  Commonly known as: Vitamin B-12  Medication Adjustments for Surgery: Stop 7 days before surgery     finasteride 1 mg tablet  Commonly known as: Propecia  Medication Adjustments for Surgery: Take morning of surgery with sip of water, no other fluids     hydroCHLOROthiazide 12.5 mg tablet  Commonly known as: HYDRODiuril  TAKE 1 TABLET BY MOUTH EVERY  DAY  Medication Adjustments for Surgery: Take morning of surgery with sip of water, no other fluids     sildenafil 50 mg tablet  Commonly known as: Viagra  Take 1 tablet daily as needed/directed.  Good Rx please  Medication Adjustments for Surgery: Stop 3 days before surgery     valsartan 320 mg tablet  Commonly known as: Diovan  TAKE 1 TABLET BY MOUTH EVERY DAY  Medication Adjustments for Surgery: Continue until night before surgery     ZINC ORAL  Medication Adjustments for Surgery: Stop 7 days before surgery              CONTACT SURGEON'S OFFICE IF YOU DEVELOP:  * Fever = 100.4 F   * New respiratory symptoms (e.g. cough, shortness of breath, respiratory distress, sore throat)  * Recent loss of taste or smell  *Flu like symptoms such as headache, fatigue or gastrointestinal symptoms  * You develop any open sores, shingles, burning or painful urination   AND/OR:  * You no longer wish to have the surgery.  * Any other personal circumstances change that may lead to the need to cancel or defer this surgery.  *You were admitted to any hospital within one week of your planned procedure.    SMOKING:  *Quitting smoking can make a huge difference to your health and recovery from surgery.    *If you need help with quitting, call 8-603-QUIT-NOW.    THE DAY BEFORE SURGERY:  *Do not eat any food after midnight the night before surgery.   *You are permitted to drink clear liquids (i.e. water, black coffee, tea, clear broth, apple juice) up to 2 hours before your surgery.    DIABETICS:  If diabetic, nothing by mouth (no solids or liquids) for 8 hours prior to surgery.   Please check fasting blood sugar upon waking up.  If fasting sugar is <80 mg/dl, please drink 100ml/3oz of apple juice no later than 2 hours prior to surgery.      SURGICAL TIME  *You will be contacted between 2 p.m. and 6 p.m. the business day before your surgery with your arrival time.  *If you haven't received a call by 6pm, call [phone].  *Scheduled  surgery times may change and you will be notified if this occurs-check your personal voicemail for any updates.    ON THE MORNING OF SURGERY:  *Wear comfortable, loose fitting clothing.   *Do not use moisturizers, creams, lotions or perfume.  *All jewelry and valuables should be left at home.  *Prosthetic devices such as contact lenses, hearing aids, dentures, eyelash extensions, hairpins and body piercing must be removed before surgery.    BRING WITH YOU:  *Photo ID and insurance card  *Current list of medicines and allergies  *Pacemaker/Defibrillator/Heart stent cards  *CPAP machine and mask  *Slings/splints/crutches  *Copy of your complete Advanced Directive/DHPOA-if applicable  *Neurostimulator implant remote    PARKING AND ARRIVAL:  *Check in at the Main Entrance desk and let them know you are here for surgery.  *You will be directed to the 2nd floor surgical waiting area.    AFTER OUTPATIENT SURGERY:  *A responsible adult MUST accompany you at the time of discharge and stay with you for 24 hours after your surgery.  *You may NOT drive yourself home after surgery.  *You may use a taxi or ride sharing service ([employer], [employer]) to return home ONLY if you are accompanied by a friend or family member.  *Instructions for resuming your medications will be provided by your surgeon.

## 2024-01-30 NOTE — H&P (VIEW-ONLY)
[facility]/PAT Evaluation       Name: Cam Hernandez (Cam Hernandez)  /Age: 1950/73 y.o.     In-Person       HPI        Date of Consult: 24    Referring Provider: Dr. Zavala    Right shoulder revision to reverse total shoulder, 24    Cam Hernandez is a 73 year-old female who presents to the [facility] for perioperative risk assessment prior to surgery.    Patient presents with a primary diagnosis of right shoulder pain. Patient shoulder dislocated. Recently had right shoulder replacement 2023. He dislocated his shoulder back in 2023    This note was created in part upon personal review of patient's medical records.      Patient is scheduled to have Right shoulder revision to reverse total shoulder      Pt denies any past history of anesthetic complications such as PONV, awareness, prolonged sedation, dental damage, aspiration, cardiac arrest, difficult intubation, difficult I.V. access or unexpected hospital admissions.  NO malignant hyperthermia and or pseudocholinesterase deficiency.  No history of blood transfusions     JANI compliant with CPAP    The patient is not a Zoroastrianism and will accept blood and blood products if medically indicated.   Type and screen sent.     Past Medical History:   Diagnosis Date    Carotid artery stenosis     s/p right carotid endarterectomy . Carotid duplex  40-59% stenosis    Chronic thoracic aortic dissection (CMS/HCC)     follows with vascular yearly    Coronary artery disease     s/p stent , follows with cardiology    Erectile dysfunction     Essential (primary) hypertension     GERD (gastroesophageal reflux disease)     resolved    Gout     HL (hearing loss)     Hyperlipidemia     Mild cognitive impairment     PAD (peripheral artery disease) (CMS/HCC)     PONV (postoperative nausea and vomiting)     Primary central sleep apnea     s/p UPPP, T&A unsuccessful, CPAP    Renal insufficiency     23: creatinine 1.01, BUN 16, GFR 78     Tinnitus     Vitamin D deficiency        Past Surgical History:   Procedure Laterality Date    CAROTID ENDARTERECTOMY Right 2013    CORONARY ANGIOPLASTY WITH STENT PLACEMENT  2013    CT ANGIO NECK  2013    CT NECK ANGIO W AND WO IV CONTRAST 2013 [facility]    OTHER SURGICAL HISTORY      EVLT, endovenous laser trt    REVISION TOTAL SHOULDER ARTHROPLASTY Right 2023    revision reverse total shoulder replacement, explant spacer    TONSILLECTOMY      with adenoidectomy    UVULOPALATOPHARYNGOPLASTY         Social History     Socioeconomic History    Marital status:      Spouse name: Not on file    Number of children: Not on file    Years of education: Not on file    Highest education level: Not on file   Occupational History    Not on file   Tobacco Use    Smoking status: Former     Types: Cigarettes     Quit date:      Years since quittin.1     Passive exposure: Never    Smokeless tobacco: Never   Vaping Use    Vaping Use: Never used   Substance and Sexual Activity    Alcohol use: Not Currently    Drug use: Never    Sexual activity: Defer   Other Topics Concern    Not on file   Social History Narrative    Not on file     Social Determinants of Health     Financial Resource Strain: Low Risk  (2023)    Overall Financial Resource Strain (CARDIA)     Difficulty of Paying Living Expenses: Not hard at all   Food Insecurity: Not on file   Transportation Needs: No Transportation Needs (2023)    PRAPARE - Transportation     Lack of Transportation (Medical): No     Lack of Transportation (Non-Medical): No   Physical Activity: Not on file   Stress: Not on file   Social Connections: Not on file   Intimate Partner Violence: Not on file   Housing Stability: Low Risk  (2023)    Housing Stability Vital Sign     Unable to Pay for Housing in the Last Year: No     Number of Places Lived in the Last Year: 1     Unstable Housing in the Last Year: No        Family History    Problem Relation Name Age of Onset    Colon cancer Mother      Hypertension Father      Heart failure Father         Allergies   Allergen Reactions    Pollen Extracts Runny nose       Current Outpatient Medications:     allopurinol (Zyloprim) 300 mg tablet, TAKE 1 TABLET BY MOUTH DAILY FOR GOUT PREVENTION (Patient taking differently: Take 0.5 tablets (150 mg) by mouth 3 times a week.), Disp: 90 tablet, Rfl: 3    amLODIPine (Norvasc) 10 mg tablet, TAKE 1 TABLET BY MOUTH EVERY DAY FOR BLOOD PRESSURE, Disp: 90 tablet, Rfl: 3    ascorbic acid, vitamin C, 500 mg capsule, Take by mouth., Disp: , Rfl:     aspirin 81 mg capsule, Take 1 tablet by mouth once daily., Disp: , Rfl:     atorvastatin (Lipitor) 80 mg tablet, TAKE 1/2 TABLET ONCE DAILY, Disp: 45 tablet, Rfl: 3    carvedilol (Coreg) 6.25 mg tablet, Take 1 tablet (6.25 mg) by mouth 2 times a day. Take with food., Disp: , Rfl:     cholecalciferol (Vitamin D-3) 125 MCG (5000 UT) capsule, Take by mouth., Disp: , Rfl:     coenzyme Q-10 100 mg capsule, Take by mouth., Disp: , Rfl:     cyanocobalamin (Vitamin B-12) 1,000 mcg tablet, Take by mouth every other day., Disp: , Rfl:     hydroCHLOROthiazide (HYDRODiuril) 12.5 mg tablet, TAKE 1 TABLET BY MOUTH EVERY DAY (Patient taking differently: Take 2 tablets (25 mg) by mouth once daily.), Disp: 90 tablet, Rfl: 3    sildenafil (Viagra) 50 mg tablet, Take 1 tablet daily as needed/directed.  Good Rx please, Disp: 30 tablet, Rfl: 1    valsartan (Diovan) 320 mg tablet, TAKE 1 TABLET BY MOUTH EVERY DAY, Disp: 90 tablet, Rfl: 3    ZINC ORAL, Take 1 tablet by mouth once daily., Disp: , Rfl:     chlorhexidine (Peridex) 0.12 % solution, Use 15 mL in the mouth or throat 2 times a day. Use night before surgery and morning of surgery Swish and spit, Disp: 473 mL, Rfl: 0    finasteride (Propecia) 1 mg tablet, Take 1 tablet (1 mg) by mouth once daily., Disp: , Rfl:      Heart Rate:  [65]   Temp:  [35.8 °C (96.4 °F)]   Resp:  [18]   BP:  "(152)/(77)   Height:  [170.2 cm (5' 7\")]   Weight:  [105 kg (232 lb 5.8 oz)]   SpO2:  [96 %]      PAT ROS:   Constitutional:   Neuro/Psych:   neg    Eyes:   neg    Ears:   neg    Nose:   neg    Mouth:   neg    Throat:   Neck:   neg    Cardio:    Functional 4 mets. Denies sob walking from pat to parking lot  neg    Respiratory:   neg    Endocrine:   neg    GI:   neg    :   neg    Musculoskeletal:    Right shoulder pain, and decrease of motion in right shoulder  Hematologic:   neg    Skin:  neg        Physical Exam  Vitals reviewed. Physical exam within normal limits.  (bilateral hearing aides)         PAT AIRWAY:   Airway:     Mallampati::  III    Neck ROM::  Full   Several missing teeth    Lab Results   Component Value Date    GLUCOSE 79 01/30/2024    CALCIUM 9.2 01/30/2024     01/30/2024    K 4.0 01/30/2024    CO2 27 01/30/2024     01/30/2024    BUN 17 01/30/2024    CREATININE 1.25 01/30/2024      Lab Results   Component Value Date    WBC 7.8 01/30/2024    HGB 15.9 01/30/2024    HCT 46.4 01/30/2024    MCV 89 01/30/2024     01/30/2024        EKG in PAT 1/30/24:  SR with AV Block with occasional PVCs  Left axis deviation  Nonspecific intraventricular block  Inferior infarct age undetermined  Possible anterolateral infarct, age undetermined  Abnormal EKG  HR 60 bpm  No new findings when compared to EKG from 11/2023    - ECHO: 5/6/2021  CONCLUSIONS:   - Exam indication: Shortness of Breath   - The left ventricle is normal in size. Left ventricular systolic function is   normal. EF = 58 ± 5% (2D biplane) Definity contrast used for endocardial border   detection. Grade I left ventricular diastolic dysfunction.   - The right ventricle is normal in size. Right ventricular systolic function is   normal.   - The left atrial cavity is mildly dilated.   - The visualized aorta is borderline dilated with a maximal dimension of 3.8 cm.   - Mild aortic sclerosis.   - Exam was compared with the prior CC " echocardiographic exam performed on   10/1/2020. There is no significant change.     - NM Stress Test: 8/13/2018  CONCLUSIONS:   1. SPECT Perfusion Study: Normal.   2. There is no scintigraphic evidence for inducible ischemia.   3. No evidence of scarred myocardium.   4. Left ventricle is normal in size. The left ventricle systolic   function is normal.   5. Right ventricle is normal in size. The right ventricle systolic   function is normal.   6. This is a low risk scan.     Gated Stress FBP   LVEF % 72     No EKG completed in the office today. Requested EKG from [facility] that was completed on last Friday for review.     Pulmonary Testing:    - CXR: 3/7/2021  IMPRESSION:   Nonspecific parenchymal changes in the lung bases as described above. A   follow-up examination with chest PA and lateral views recommended when   the patient's condition will permit.     - PFT: 5/25/2021  IMPRESSION   The TLC, RV and RV/TLC are normal.   The diffusing capacity is normal.:     - PSG: NONE     Coronary Artery Duplex 2017  CONCLUSIONS:  Right Carotid: Findings are consistent with less than 50% stenosis of the right ICA. Right external carotid artery appears patent with no evidence of stenosis. No evidence of hemodynamically significant stenosis of the right common carotid artery. The right vertebral artery is patent with antegrade flow. No evidence of hemodynamically significant stenosis in the right subclavian.  Left Carotid: Findings are consistent with less than 50% stenosis of the left ICA. No evidence of hemodynamically significant stenosis of the left common carotid artery. The left vertebral artery is patent with antegrade flow.  No evidence of hemodynamically significant stenosis in the left subclavian.     Imaging & Doppler Findings:  Right Plaque Morph: The right carotid bulb demonstrates irregular and heterogenous plaque.  Left Plaque Morph: The proximal left internal carotid artery demonstrates irregular and  heterogenous plaque. The proximal left external carotid artery demonstrates irregular and heterogenous plaque. The left carotid bulb demonstrates irregular and heterogenous plaque.    Bilateral Carotid US 2023  IMPRESSION   ----------   Compared to prior study of 7/13/2022, No significant change from previous study.     RIGHT SIDE     Common carotid artery: Plaque visualized without evidence of hemodynamically   significant stenosis.   Endarterectomy patch from mid to distal : 1.23 x 1.30 x 1.28cm.     Internal carotid artery: 20-39% stenosis.   Endarterectomy patch from origin to proximal .     External carotid artery: Patent.     Vertebral artery: Patent and antegrade flow noted.     Innominate artery: Patent.     Subclavian artery: Patent.     LEFT SIDE     Common carotid artery: Plaque visualized without evidence of hemodynamically   significant stenosis.     Internal carotid artery: 40-59% stenosis.     External carotid artery: Patent.     Vertebral artery: Patent and antegrade flow noted.     Subclavian artery: Patent.     US Abdominal Aorta 7/2023  IMPRESSION   ----------   Compared to prior study of 7/13/2022, Unable to duplicate 2.7cm measurement, in   the aorta, at the level of the renals, on today's exam. Previous study was   technically difficult.     AORTA Aorta dissection noted at mid. Known dissection at mid/dst vessel.     RIGHT VESSELS   Common iliac artery patent without evidence of aneurysm throughout.   Common iliac artery plaque noted without evidence of hemodynamically significant   stenosis throughout.   Internal iliac artery patent without evidence of aneurysm at origin.     LEFT VESSELS   Common iliac artery 50-99% stenosis at origin.   Common iliac artery patent without evidence of aneurysm throughout.   Internal iliac artery patent without evidence of aneurysm at proximal.       Assessment and Plan:         Patient is a 73-year-old male scheduled for a with Dr. Zavala on 2/7/24  .  Patient has no active cardiac symptoms.   Patient denies any chest pain, tightness, heaviness, pressure, radiating pain, palpitations, irregular heartbeats, lightheadedness, cough, congestion, shortness of breath, ARCHULETA, PND, near syncope, weight loss or gain.     RCRI  2 , 10 % Risk of MACE    Cardiology:  -- controlled HTN: EKG, CBC and BMP ordered  -- Cardiac clearance deferred due to patient recently had surgery 11/2023 with no complications and patient is asymptomatic from a cardiac standpoint    Pulmonology:  -- Known and treated  JANI- Patient was instructed to bring CPAP machine the morning of surgery. Recommend prioritizing  nonopioid analgesic techniques (regional and local anesthesia, nonsteroidal medications, etc) before the administration of opioids and close monitoring for hypoventilation after surgery due to JANI. Increased vigilance is recommended with the use of narcotics due to an increased risk for opioid induced respiratory depression     Vascular:  -- History of CEA and thoracic aorta dissection. On chronic low dose asa. Instruct to conitnue low dose asa perioperatively  -- follows up with vascular annually    Hematology:  Patient instructed to ambulate as soon as possible postoperatively to decrease thromboembolic risk.   Initiate mechanical DVT prophylaxis as soon as possible and initiate chemical prophylaxis when deemed safe from a bleeding standpoint post surgery.     Caprini: 6    Renal:  -- Recommendations to avoid nephrotoxic drugs and carefully monitor fluid status to maintain euvolemia. Use dose adjusted medications as needed for the underlying level of renal function.      Risk assessment complete.  Patient is scheduled for a low surgical risk procedure.        Preoperative medication instructions were provided and reviewed with the patient.  Any additional testing or evaluation was explained to the patient.  Nothing by mouth instructions were discussed and patient's questions were  answered prior to conclusion to this encounter.  Patient verbalized understanding of preoperative instructions given in preadmission testing; discharge instructions available in EMR.    This note was dictated by a speech recognition.  Minor errors may have been detected in a speech recognition.

## 2024-01-30 NOTE — CPM/PAT H&P
[facility]/PAT Evaluation       Name: Cam Hernandez (Cam Hernandez)  /Age: 1950/73 y.o.     In-Person       HPI        Date of Consult: 24    Referring Provider: Dr. Zavala    Right shoulder revision to reverse total shoulder, 24    Cam Hernandez is a 73 year-old female who presents to the [facility] for perioperative risk assessment prior to surgery.    Patient presents with a primary diagnosis of right shoulder pain. Patient shoulder dislocated. Recently had right shoulder replacement 2023. He dislocated his shoulder back in 2023    This note was created in part upon personal review of patient's medical records.      Patient is scheduled to have Right shoulder revision to reverse total shoulder      Pt denies any past history of anesthetic complications such as PONV, awareness, prolonged sedation, dental damage, aspiration, cardiac arrest, difficult intubation, difficult I.V. access or unexpected hospital admissions.  NO malignant hyperthermia and or pseudocholinesterase deficiency.  No history of blood transfusions     JANI compliant with CPAP    The patient is not a Bahai and will accept blood and blood products if medically indicated.   Type and screen sent.     Past Medical History:   Diagnosis Date    Carotid artery stenosis     s/p right carotid endarterectomy . Carotid duplex  40-59% stenosis    Chronic thoracic aortic dissection (CMS/HCC)     follows with vascular yearly    Coronary artery disease     s/p stent , follows with cardiology    Erectile dysfunction     Essential (primary) hypertension     GERD (gastroesophageal reflux disease)     resolved    Gout     HL (hearing loss)     Hyperlipidemia     Mild cognitive impairment     PAD (peripheral artery disease) (CMS/HCC)     PONV (postoperative nausea and vomiting)     Primary central sleep apnea     s/p UPPP, T&A unsuccessful, CPAP    Renal insufficiency     23: creatinine 1.01, BUN 16, GFR 78     Tinnitus     Vitamin D deficiency        Past Surgical History:   Procedure Laterality Date    CAROTID ENDARTERECTOMY Right 2013    CORONARY ANGIOPLASTY WITH STENT PLACEMENT  2013    CT ANGIO NECK  2013    CT NECK ANGIO W AND WO IV CONTRAST 2013 [facility]CI[facility]GAC[facility]    OTHER SURGICAL HISTORY      EVLT, endovenous laser trt    REVISION TOTAL SHOULDER ARTHROPLASTY Right 2023    revision reverse total shoulder replacement, explant spacer    TONSILLECTOMY      with adenoidectomy    UVULOPALATOPHARYNGOPLASTY         Social History     Socioeconomic History    Marital status:      Spouse name: Not on file    Number of children: Not on file    Years of education: Not on file    Highest education level: Not on file   Occupational History    Not on file   Tobacco Use    Smoking status: Former     Types: Cigarettes     Quit date:      Years since quittin.1     Passive exposure: Never    Smokeless tobacco: Never   Vaping Use    Vaping Use: Never used   Substance and Sexual Activity    Alcohol use: Not Currently    Drug use: Never    Sexual activity: Defer   Other Topics Concern    Not on file   Social History Narrative    Not on file     Social Determinants of Health     Financial Resource Strain: Low Risk  (2023)    Overall Financial Resource Strain (CARDIA)     Difficulty of Paying Living Expenses: Not hard at all   Food Insecurity: Not on file   Transportation Needs: No Transportation Needs (2023)    PRAPARE - Transportation     Lack of Transportation (Medical): No     Lack of Transportation (Non-Medical): No   Physical Activity: Not on file   Stress: Not on file   Social Connections: Not on file   Intimate Partner Violence: Not on file   Housing Stability: Low Risk  (2023)    Housing Stability Vital Sign     Unable to Pay for Housing in the Last Year: No     Number of Places Lived in the Last Year: 1     Unstable Housing in the Last Year: No        Family History    Problem Relation Name Age of Onset    Colon cancer Mother      Hypertension Father      Heart failure Father         Allergies   Allergen Reactions    Pollen Extracts Runny nose       Current Outpatient Medications:     allopurinol (Zyloprim) 300 mg tablet, TAKE 1 TABLET BY MOUTH DAILY FOR GOUT PREVENTION (Patient taking differently: Take 0.5 tablets (150 mg) by mouth 3 times a week.), Disp: 90 tablet, Rfl: 3    amLODIPine (Norvasc) 10 mg tablet, TAKE 1 TABLET BY MOUTH EVERY DAY FOR BLOOD PRESSURE, Disp: 90 tablet, Rfl: 3    ascorbic acid, vitamin C, 500 mg capsule, Take by mouth., Disp: , Rfl:     aspirin 81 mg capsule, Take 1 tablet by mouth once daily., Disp: , Rfl:     atorvastatin (Lipitor) 80 mg tablet, TAKE 1/2 TABLET ONCE DAILY, Disp: 45 tablet, Rfl: 3    carvedilol (Coreg) 6.25 mg tablet, Take 1 tablet (6.25 mg) by mouth 2 times a day. Take with food., Disp: , Rfl:     cholecalciferol (Vitamin D-3) 125 MCG (5000 UT) capsule, Take by mouth., Disp: , Rfl:     coenzyme Q-10 100 mg capsule, Take by mouth., Disp: , Rfl:     cyanocobalamin (Vitamin B-12) 1,000 mcg tablet, Take by mouth every other day., Disp: , Rfl:     hydroCHLOROthiazide (HYDRODiuril) 12.5 mg tablet, TAKE 1 TABLET BY MOUTH EVERY DAY (Patient taking differently: Take 2 tablets (25 mg) by mouth once daily.), Disp: 90 tablet, Rfl: 3    sildenafil (Viagra) 50 mg tablet, Take 1 tablet daily as needed/directed.  Good Rx please, Disp: 30 tablet, Rfl: 1    valsartan (Diovan) 320 mg tablet, TAKE 1 TABLET BY MOUTH EVERY DAY, Disp: 90 tablet, Rfl: 3    ZINC ORAL, Take 1 tablet by mouth once daily., Disp: , Rfl:     chlorhexidine (Peridex) 0.12 % solution, Use 15 mL in the mouth or throat 2 times a day. Use night before surgery and morning of surgery Swish and spit, Disp: 473 mL, Rfl: 0    finasteride (Propecia) 1 mg tablet, Take 1 tablet (1 mg) by mouth once daily., Disp: , Rfl:      Heart Rate:  [65]   Temp:  [35.8 °C (96.4 °F)]   Resp:  [18]   BP:  "(152)/(77)   Height:  [170.2 cm (5' 7\")]   Weight:  [105 kg (232 lb 5.8 oz)]   SpO2:  [96 %]      PAT ROS:   Constitutional:   Neuro/Psych:   neg    Eyes:   neg    Ears:   neg    Nose:   neg    Mouth:   neg    Throat:   Neck:   neg    Cardio:    Functional 4 mets. Denies sob walking from pat to parking lot  neg    Respiratory:   neg    Endocrine:   neg    GI:   neg    :   neg    Musculoskeletal:    Right shoulder pain, and decrease of motion in right shoulder  Hematologic:   neg    Skin:  neg        Physical Exam  Vitals reviewed. Physical exam within normal limits.  (bilateral hearing aides)         PAT AIRWAY:   Airway:     Mallampati::  III    Neck ROM::  Full   Several missing teeth    Lab Results   Component Value Date    GLUCOSE 79 01/30/2024    CALCIUM 9.2 01/30/2024     01/30/2024    K 4.0 01/30/2024    CO2 27 01/30/2024     01/30/2024    BUN 17 01/30/2024    CREATININE 1.25 01/30/2024      Lab Results   Component Value Date    WBC 7.8 01/30/2024    HGB 15.9 01/30/2024    HCT 46.4 01/30/2024    MCV 89 01/30/2024     01/30/2024        EKG in PAT 1/30/24:  SR with AV Block with occasional PVCs  Left axis deviation  Nonspecific intraventricular block  Inferior infarct age undetermined  Possible anterolateral infarct, age undetermined  Abnormal EKG  HR 60 bpm  No new findings when compared to EKG from 11/2023    - ECHO: 5/6/2021  CONCLUSIONS:   - Exam indication: Shortness of Breath   - The left ventricle is normal in size. Left ventricular systolic function is   normal. EF = 58 ± 5% (2D biplane) Definity contrast used for endocardial border   detection. Grade I left ventricular diastolic dysfunction.   - The right ventricle is normal in size. Right ventricular systolic function is   normal.   - The left atrial cavity is mildly dilated.   - The visualized aorta is borderline dilated with a maximal dimension of 3.8 cm.   - Mild aortic sclerosis.   - Exam was compared with the prior CC " echocardiographic exam performed on   10/1/2020. There is no significant change.     - NM Stress Test: 8/13/2018  CONCLUSIONS:   1. SPECT Perfusion Study: Normal.   2. There is no scintigraphic evidence for inducible ischemia.   3. No evidence of scarred myocardium.   4. Left ventricle is normal in size. The left ventricle systolic   function is normal.   5. Right ventricle is normal in size. The right ventricle systolic   function is normal.   6. This is a low risk scan.     Gated Stress FBP   LVEF % 72     No EKG completed in the office today. Requested EKG from [facility] that was completed on last Friday for review.     Pulmonary Testing:    - CXR: 3/7/2021  IMPRESSION:   Nonspecific parenchymal changes in the lung bases as described above. A   follow-up examination with chest PA and lateral views recommended when   the patient's condition will permit.     - PFT: 5/25/2021  IMPRESSION   The TLC, RV and RV/TLC are normal.   The diffusing capacity is normal.:     - PSG: NONE     Coronary Artery Duplex 2017  CONCLUSIONS:  Right Carotid: Findings are consistent with less than 50% stenosis of the right ICA. Right external carotid artery appears patent with no evidence of stenosis. No evidence of hemodynamically significant stenosis of the right common carotid artery. The right vertebral artery is patent with antegrade flow. No evidence of hemodynamically significant stenosis in the right subclavian.  Left Carotid: Findings are consistent with less than 50% stenosis of the left ICA. No evidence of hemodynamically significant stenosis of the left common carotid artery. The left vertebral artery is patent with antegrade flow.  No evidence of hemodynamically significant stenosis in the left subclavian.     Imaging & Doppler Findings:  Right Plaque Morph: The right carotid bulb demonstrates irregular and heterogenous plaque.  Left Plaque Morph: The proximal left internal carotid artery demonstrates irregular and  heterogenous plaque. The proximal left external carotid artery demonstrates irregular and heterogenous plaque. The left carotid bulb demonstrates irregular and heterogenous plaque.    Bilateral Carotid US 2023  IMPRESSION   ----------   Compared to prior study of 7/13/2022, No significant change from previous study.     RIGHT SIDE     Common carotid artery: Plaque visualized without evidence of hemodynamically   significant stenosis.   Endarterectomy patch from mid to distal : 1.23 x 1.30 x 1.28cm.     Internal carotid artery: 20-39% stenosis.   Endarterectomy patch from origin to proximal .     External carotid artery: Patent.     Vertebral artery: Patent and antegrade flow noted.     Innominate artery: Patent.     Subclavian artery: Patent.     LEFT SIDE     Common carotid artery: Plaque visualized without evidence of hemodynamically   significant stenosis.     Internal carotid artery: 40-59% stenosis.     External carotid artery: Patent.     Vertebral artery: Patent and antegrade flow noted.     Subclavian artery: Patent.     US Abdominal Aorta 7/2023  IMPRESSION   ----------   Compared to prior study of 7/13/2022, Unable to duplicate 2.7cm measurement, in   the aorta, at the level of the renals, on today's exam. Previous study was   technically difficult.     AORTA Aorta dissection noted at mid. Known dissection at mid/dst vessel.     RIGHT VESSELS   Common iliac artery patent without evidence of aneurysm throughout.   Common iliac artery plaque noted without evidence of hemodynamically significant   stenosis throughout.   Internal iliac artery patent without evidence of aneurysm at origin.     LEFT VESSELS   Common iliac artery 50-99% stenosis at origin.   Common iliac artery patent without evidence of aneurysm throughout.   Internal iliac artery patent without evidence of aneurysm at proximal.       Assessment and Plan:         Patient is a 73-year-old male scheduled for a with Dr. Zavala on 2/7/24  .  Patient has no active cardiac symptoms.   Patient denies any chest pain, tightness, heaviness, pressure, radiating pain, palpitations, irregular heartbeats, lightheadedness, cough, congestion, shortness of breath, ARCHULETA, PND, near syncope, weight loss or gain.     RCRI  2 , 10 % Risk of MACE    Cardiology:  -- controlled HTN: EKG, CBC and BMP ordered  -- Cardiac clearance deferred due to patient recently had surgery 11/2023 with no complications and patient is asymptomatic from a cardiac standpoint    Pulmonology:  -- Known and treated  JANI- Patient was instructed to bring CPAP machine the morning of surgery. Recommend prioritizing  nonopioid analgesic techniques (regional and local anesthesia, nonsteroidal medications, etc) before the administration of opioids and close monitoring for hypoventilation after surgery due to JANI. Increased vigilance is recommended with the use of narcotics due to an increased risk for opioid induced respiratory depression     Vascular:  -- History of CEA and thoracic aorta dissection. On chronic low dose asa. Instruct to conitnue low dose asa perioperatively  -- follows up with vascular annually    Hematology:  Patient instructed to ambulate as soon as possible postoperatively to decrease thromboembolic risk.   Initiate mechanical DVT prophylaxis as soon as possible and initiate chemical prophylaxis when deemed safe from a bleeding standpoint post surgery.     Caprini: 6    Renal:  -- Recommendations to avoid nephrotoxic drugs and carefully monitor fluid status to maintain euvolemia. Use dose adjusted medications as needed for the underlying level of renal function.      Risk assessment complete.  Patient is scheduled for a low surgical risk procedure.        Preoperative medication instructions were provided and reviewed with the patient.  Any additional testing or evaluation was explained to the patient.  Nothing by mouth instructions were discussed and patient's questions were  answered prior to conclusion to this encounter.  Patient verbalized understanding of preoperative instructions given in preadmission testing; discharge instructions available in EMR.    This note was dictated by a speech recognition.  Minor errors may have been detected in a speech recognition.

## 2024-02-01 LAB — STAPHYLOCOCCUS SPEC CULT: NORMAL

## 2024-02-07 ENCOUNTER — APPOINTMENT (OUTPATIENT)
Dept: RADIOLOGY | Facility: HOSPITAL | Age: 74
DRG: 483 | End: 2024-02-07
Payer: MEDICARE

## 2024-02-07 ENCOUNTER — ANESTHESIA (OUTPATIENT)
Dept: OPERATING ROOM | Facility: HOSPITAL | Age: 74
DRG: 483 | End: 2024-02-07
Payer: MEDICARE

## 2024-02-07 ENCOUNTER — HOSPITAL ENCOUNTER (INPATIENT)
Facility: HOSPITAL | Age: 74
LOS: 1 days | Discharge: HOME | DRG: 483 | End: 2024-02-08
Attending: ORTHOPAEDIC SURGERY | Admitting: ORTHOPAEDIC SURGERY
Payer: MEDICARE

## 2024-02-07 ENCOUNTER — ANESTHESIA EVENT (OUTPATIENT)
Dept: OPERATING ROOM | Facility: HOSPITAL | Age: 74
DRG: 483 | End: 2024-02-07
Payer: MEDICARE

## 2024-02-07 DIAGNOSIS — I10 ESSENTIAL (PRIMARY) HYPERTENSION: ICD-10-CM

## 2024-02-07 DIAGNOSIS — M19.011 ARTHRITIS OF RIGHT SHOULDER REGION: ICD-10-CM

## 2024-02-07 DIAGNOSIS — M19.019 SHOULDER ARTHRITIS: Primary | ICD-10-CM

## 2024-02-07 DIAGNOSIS — M10.9 GOUT, UNSPECIFIED: ICD-10-CM

## 2024-02-07 DIAGNOSIS — S43.004A DISLOCATION, SHOULDER CLOSED, RIGHT, INITIAL ENCOUNTER: ICD-10-CM

## 2024-02-07 PROBLEM — Z95.5 STENTED CORONARY ARTERY: Status: ACTIVE | Noted: 2024-02-07

## 2024-02-07 LAB
CRP SERPL-MCNC: 0.39 MG/DL
ERYTHROCYTE [SEDIMENTATION RATE] IN BLOOD BY WESTERGREN METHOD: 20 MM/H (ref 0–20)

## 2024-02-07 PROCEDURE — 7100000001 HC RECOVERY ROOM TIME - INITIAL BASE CHARGE: Performed by: ORTHOPAEDIC SURGERY

## 2024-02-07 PROCEDURE — 2720000007 HC OR 272 NO HCPCS: Performed by: ORTHOPAEDIC SURGERY

## 2024-02-07 PROCEDURE — 64415 NJX AA&/STRD BRCH PLXS IMG: CPT | Performed by: ANESTHESIOLOGY

## 2024-02-07 PROCEDURE — 3600000005 HC OR TIME - INITIAL BASE CHARGE - PROCEDURE LEVEL FIVE: Performed by: ORTHOPAEDIC SURGERY

## 2024-02-07 PROCEDURE — 2780000003 HC OR 278 NO HCPCS: Performed by: ORTHOPAEDIC SURGERY

## 2024-02-07 PROCEDURE — 2500000005 HC RX 250 GENERAL PHARMACY W/O HCPCS: Performed by: ANESTHESIOLOGY

## 2024-02-07 PROCEDURE — 99100 ANES PT EXTEME AGE<1 YR&>70: CPT | Performed by: ANESTHESIOLOGY

## 2024-02-07 PROCEDURE — 1100000001 HC PRIVATE ROOM DAILY

## 2024-02-07 PROCEDURE — 73020 X-RAY EXAM OF SHOULDER: CPT | Mod: RIGHT SIDE | Performed by: RADIOLOGY

## 2024-02-07 PROCEDURE — 86140 C-REACTIVE PROTEIN: CPT | Performed by: NURSE PRACTITIONER

## 2024-02-07 PROCEDURE — 88300 SURGICAL PATH GROSS: CPT | Performed by: SPECIALIST

## 2024-02-07 PROCEDURE — 3600000010 HC OR TIME - EACH INCREMENTAL 1 MINUTE - PROCEDURE LEVEL FIVE: Performed by: ORTHOPAEDIC SURGERY

## 2024-02-07 PROCEDURE — 23474 REVIS RECONST SHOULDER JOINT: CPT | Performed by: NURSE PRACTITIONER

## 2024-02-07 PROCEDURE — 2500000001 HC RX 250 WO HCPCS SELF ADMINISTERED DRUGS (ALT 637 FOR MEDICARE OP)

## 2024-02-07 PROCEDURE — 2500000005 HC RX 250 GENERAL PHARMACY W/O HCPCS: Performed by: NURSE PRACTITIONER

## 2024-02-07 PROCEDURE — 85652 RBC SED RATE AUTOMATED: CPT | Performed by: NURSE PRACTITIONER

## 2024-02-07 PROCEDURE — 2500000004 HC RX 250 GENERAL PHARMACY W/ HCPCS (ALT 636 FOR OP/ED): Performed by: NURSE ANESTHETIST, CERTIFIED REGISTERED

## 2024-02-07 PROCEDURE — 87205 SMEAR GRAM STAIN: CPT | Mod: AHULAB,MUE | Performed by: ORTHOPAEDIC SURGERY

## 2024-02-07 PROCEDURE — C1713 ANCHOR/SCREW BN/BN,TIS/BN: HCPCS | Performed by: ORTHOPAEDIC SURGERY

## 2024-02-07 PROCEDURE — 23474 REVIS RECONST SHOULDER JOINT: CPT | Performed by: ORTHOPAEDIC SURGERY

## 2024-02-07 PROCEDURE — A23474: Performed by: ANESTHESIOLOGY

## 2024-02-07 PROCEDURE — C1776 JOINT DEVICE (IMPLANTABLE): HCPCS | Performed by: ORTHOPAEDIC SURGERY

## 2024-02-07 PROCEDURE — 3700000002 HC GENERAL ANESTHESIA TIME - EACH INCREMENTAL 1 MINUTE: Performed by: ORTHOPAEDIC SURGERY

## 2024-02-07 PROCEDURE — 2500000004 HC RX 250 GENERAL PHARMACY W/ HCPCS (ALT 636 FOR OP/ED): Performed by: ORTHOPAEDIC SURGERY

## 2024-02-07 PROCEDURE — 2500000001 HC RX 250 WO HCPCS SELF ADMINISTERED DRUGS (ALT 637 FOR MEDICARE OP): Performed by: NURSE ANESTHETIST, CERTIFIED REGISTERED

## 2024-02-07 PROCEDURE — 7100000002 HC RECOVERY ROOM TIME - EACH INCREMENTAL 1 MINUTE: Performed by: ORTHOPAEDIC SURGERY

## 2024-02-07 PROCEDURE — 88300 SURGICAL PATH GROSS: CPT | Mod: TC | Performed by: NURSE PRACTITIONER

## 2024-02-07 PROCEDURE — 2500000004 HC RX 250 GENERAL PHARMACY W/ HCPCS (ALT 636 FOR OP/ED): Performed by: NURSE PRACTITIONER

## 2024-02-07 PROCEDURE — 2500000005 HC RX 250 GENERAL PHARMACY W/O HCPCS: Performed by: NURSE ANESTHETIST, CERTIFIED REGISTERED

## 2024-02-07 PROCEDURE — 0RRJ0JZ REPLACEMENT OF RIGHT SHOULDER JOINT WITH SYNTHETIC SUBSTITUTE, OPEN APPROACH: ICD-10-PCS | Performed by: ORTHOPAEDIC SURGERY

## 2024-02-07 PROCEDURE — 3700000001 HC GENERAL ANESTHESIA TIME - INITIAL BASE CHARGE: Performed by: ORTHOPAEDIC SURGERY

## 2024-02-07 PROCEDURE — 36415 COLL VENOUS BLD VENIPUNCTURE: CPT | Performed by: NURSE PRACTITIONER

## 2024-02-07 PROCEDURE — 0RPJ0JZ REMOVAL OF SYNTHETIC SUBSTITUTE FROM RIGHT SHOULDER JOINT, OPEN APPROACH: ICD-10-PCS | Performed by: ORTHOPAEDIC SURGERY

## 2024-02-07 PROCEDURE — 2500000001 HC RX 250 WO HCPCS SELF ADMINISTERED DRUGS (ALT 637 FOR MEDICARE OP): Performed by: NURSE PRACTITIONER

## 2024-02-07 PROCEDURE — A9999 DME SUPPLY OR ACCESSORY, NOS: HCPCS | Performed by: ORTHOPAEDIC SURGERY

## 2024-02-07 PROCEDURE — A23474: Performed by: NURSE ANESTHETIST, CERTIFIED REGISTERED

## 2024-02-07 PROCEDURE — 73020 X-RAY EXAM OF SHOULDER: CPT | Mod: RT

## 2024-02-07 PROCEDURE — A4217 STERILE WATER/SALINE, 500 ML: HCPCS | Performed by: ORTHOPAEDIC SURGERY

## 2024-02-07 DEVICE — IMPLANTABLE DEVICE: Type: IMPLANTABLE DEVICE | Site: SHOULDER | Status: FUNCTIONAL

## 2024-02-07 RX ORDER — AMLODIPINE BESYLATE 10 MG/1
10 TABLET ORAL DAILY
Status: DISCONTINUED | OUTPATIENT
Start: 2024-02-08 | End: 2024-02-08 | Stop reason: HOSPADM

## 2024-02-07 RX ORDER — SODIUM CHLORIDE 0.9 G/100ML
IRRIGANT IRRIGATION AS NEEDED
Status: DISCONTINUED | OUTPATIENT
Start: 2024-02-07 | End: 2024-02-07 | Stop reason: HOSPADM

## 2024-02-07 RX ORDER — LIDOCAINE HYDROCHLORIDE 20 MG/ML
INJECTION, SOLUTION EPIDURAL; INFILTRATION; INTRACAUDAL; PERINEURAL AS NEEDED
Status: DISCONTINUED | OUTPATIENT
Start: 2024-02-07 | End: 2024-02-07

## 2024-02-07 RX ORDER — CEFAZOLIN 1 G/1
INJECTION, POWDER, FOR SOLUTION INTRAVENOUS AS NEEDED
Status: DISCONTINUED | OUTPATIENT
Start: 2024-02-07 | End: 2024-02-07

## 2024-02-07 RX ORDER — ONDANSETRON HYDROCHLORIDE 2 MG/ML
INJECTION, SOLUTION INTRAVENOUS AS NEEDED
Status: DISCONTINUED | OUTPATIENT
Start: 2024-02-07 | End: 2024-02-07

## 2024-02-07 RX ORDER — VANCOMYCIN HYDROCHLORIDE 1 G/20ML
INJECTION, POWDER, LYOPHILIZED, FOR SOLUTION INTRAVENOUS AS NEEDED
Status: DISCONTINUED | OUTPATIENT
Start: 2024-02-07 | End: 2024-02-07 | Stop reason: HOSPADM

## 2024-02-07 RX ORDER — PROPOFOL 10 MG/ML
INJECTION, EMULSION INTRAVENOUS AS NEEDED
Status: DISCONTINUED | OUTPATIENT
Start: 2024-02-07 | End: 2024-02-07

## 2024-02-07 RX ORDER — LIDOCAINE HYDROCHLORIDE 10 MG/ML
0.1 INJECTION, SOLUTION EPIDURAL; INFILTRATION; INTRACAUDAL; PERINEURAL ONCE
Status: DISCONTINUED | OUTPATIENT
Start: 2024-02-07 | End: 2024-02-07 | Stop reason: HOSPADM

## 2024-02-07 RX ORDER — SODIUM CHLORIDE, SODIUM LACTATE, POTASSIUM CHLORIDE, CALCIUM CHLORIDE 600; 310; 30; 20 MG/100ML; MG/100ML; MG/100ML; MG/100ML
100 INJECTION, SOLUTION INTRAVENOUS CONTINUOUS
Status: DISCONTINUED | OUTPATIENT
Start: 2024-02-07 | End: 2024-02-07 | Stop reason: HOSPADM

## 2024-02-07 RX ORDER — OXYCODONE AND ACETAMINOPHEN 5; 325 MG/1; MG/1
1 TABLET ORAL EVERY 4 HOURS PRN
Status: DISCONTINUED | OUTPATIENT
Start: 2024-02-07 | End: 2024-02-08 | Stop reason: HOSPADM

## 2024-02-07 RX ORDER — ONDANSETRON HYDROCHLORIDE 2 MG/ML
4 INJECTION, SOLUTION INTRAVENOUS EVERY 8 HOURS PRN
Status: DISCONTINUED | OUTPATIENT
Start: 2024-02-07 | End: 2024-02-08 | Stop reason: HOSPADM

## 2024-02-07 RX ORDER — SODIUM CHLORIDE, SODIUM LACTATE, POTASSIUM CHLORIDE, CALCIUM CHLORIDE 600; 310; 30; 20 MG/100ML; MG/100ML; MG/100ML; MG/100ML
50 INJECTION, SOLUTION INTRAVENOUS CONTINUOUS
Status: DISCONTINUED | OUTPATIENT
Start: 2024-02-07 | End: 2024-02-08 | Stop reason: HOSPADM

## 2024-02-07 RX ORDER — DOCUSATE SODIUM 100 MG/1
100 CAPSULE, LIQUID FILLED ORAL 2 TIMES DAILY
Status: DISCONTINUED | OUTPATIENT
Start: 2024-02-07 | End: 2024-02-08 | Stop reason: HOSPADM

## 2024-02-07 RX ORDER — ESMOLOL HYDROCHLORIDE 10 MG/ML
INJECTION INTRAVENOUS AS NEEDED
Status: DISCONTINUED | OUTPATIENT
Start: 2024-02-07 | End: 2024-02-07

## 2024-02-07 RX ORDER — NAPROXEN SODIUM 220 MG/1
81 TABLET, FILM COATED ORAL DAILY
Status: DISCONTINUED | OUTPATIENT
Start: 2024-02-08 | End: 2024-02-08 | Stop reason: HOSPADM

## 2024-02-07 RX ORDER — TRANEXAMIC ACID 100 MG/ML
INJECTION, SOLUTION INTRAVENOUS AS NEEDED
Status: DISCONTINUED | OUTPATIENT
Start: 2024-02-07 | End: 2024-02-07

## 2024-02-07 RX ORDER — ATORVASTATIN CALCIUM 40 MG/1
40 TABLET, FILM COATED ORAL NIGHTLY
Status: DISCONTINUED | OUTPATIENT
Start: 2024-02-07 | End: 2024-02-08 | Stop reason: HOSPADM

## 2024-02-07 RX ORDER — ACETAMINOPHEN 325 MG/1
650 TABLET ORAL EVERY 4 HOURS PRN
Status: DISCONTINUED | OUTPATIENT
Start: 2024-02-07 | End: 2024-02-08 | Stop reason: HOSPADM

## 2024-02-07 RX ORDER — OXYCODONE HYDROCHLORIDE 5 MG/1
5 TABLET ORAL EVERY 4 HOURS PRN
Status: DISCONTINUED | OUTPATIENT
Start: 2024-02-07 | End: 2024-02-07 | Stop reason: HOSPADM

## 2024-02-07 RX ORDER — CEFAZOLIN SODIUM 2 G/100ML
2 INJECTION, SOLUTION INTRAVENOUS EVERY 6 HOURS
Status: COMPLETED | OUTPATIENT
Start: 2024-02-07 | End: 2024-02-08

## 2024-02-07 RX ORDER — ONDANSETRON HYDROCHLORIDE 2 MG/ML
4 INJECTION, SOLUTION INTRAVENOUS ONCE AS NEEDED
Status: DISCONTINUED | OUTPATIENT
Start: 2024-02-07 | End: 2024-02-07 | Stop reason: HOSPADM

## 2024-02-07 RX ORDER — NALOXONE HYDROCHLORIDE 0.4 MG/ML
0.2 INJECTION, SOLUTION INTRAMUSCULAR; INTRAVENOUS; SUBCUTANEOUS EVERY 5 MIN PRN
Status: DISCONTINUED | OUTPATIENT
Start: 2024-02-07 | End: 2024-02-08 | Stop reason: HOSPADM

## 2024-02-07 RX ORDER — DEXAMETHASONE SODIUM PHOSPHATE 4 MG/ML
INJECTION, SOLUTION INTRA-ARTICULAR; INTRALESIONAL; INTRAMUSCULAR; INTRAVENOUS; SOFT TISSUE AS NEEDED
Status: DISCONTINUED | OUTPATIENT
Start: 2024-02-07 | End: 2024-02-07

## 2024-02-07 RX ORDER — ROCURONIUM BROMIDE 10 MG/ML
INJECTION, SOLUTION INTRAVENOUS AS NEEDED
Status: DISCONTINUED | OUTPATIENT
Start: 2024-02-07 | End: 2024-02-07

## 2024-02-07 RX ORDER — CARVEDILOL 6.25 MG/1
6.25 TABLET ORAL 2 TIMES DAILY
Status: DISCONTINUED | OUTPATIENT
Start: 2024-02-07 | End: 2024-02-08 | Stop reason: HOSPADM

## 2024-02-07 RX ORDER — MEPERIDINE HYDROCHLORIDE 25 MG/ML
12.5 INJECTION INTRAMUSCULAR; INTRAVENOUS; SUBCUTANEOUS EVERY 10 MIN PRN
Status: DISCONTINUED | OUTPATIENT
Start: 2024-02-07 | End: 2024-02-07 | Stop reason: HOSPADM

## 2024-02-07 RX ORDER — PHENYLEPHRINE HCL IN 0.9% NACL 1 MG/10 ML
SYRINGE (ML) INTRAVENOUS AS NEEDED
Status: DISCONTINUED | OUTPATIENT
Start: 2024-02-07 | End: 2024-02-07

## 2024-02-07 RX ORDER — FENTANYL CITRATE 50 UG/ML
INJECTION, SOLUTION INTRAMUSCULAR; INTRAVENOUS AS NEEDED
Status: DISCONTINUED | OUTPATIENT
Start: 2024-02-07 | End: 2024-02-07

## 2024-02-07 RX ORDER — ACETAMINOPHEN 325 MG/1
650 TABLET ORAL EVERY 6 HOURS SCHEDULED
Status: DISCONTINUED | OUTPATIENT
Start: 2024-02-07 | End: 2024-02-08 | Stop reason: HOSPADM

## 2024-02-07 RX ORDER — ONDANSETRON 4 MG/1
4 TABLET, FILM COATED ORAL EVERY 8 HOURS PRN
Status: DISCONTINUED | OUTPATIENT
Start: 2024-02-07 | End: 2024-02-08 | Stop reason: HOSPADM

## 2024-02-07 RX ADMIN — SODIUM CHLORIDE, POTASSIUM CHLORIDE, SODIUM LACTATE AND CALCIUM CHLORIDE 50 ML/HR: 600; 310; 30; 20 INJECTION, SOLUTION INTRAVENOUS at 21:02

## 2024-02-07 RX ADMIN — PROPOFOL 10 MG: 10 INJECTION, EMULSION INTRAVENOUS at 13:06

## 2024-02-07 RX ADMIN — Medication 4 L/MIN: at 14:39

## 2024-02-07 RX ADMIN — DEXAMETHASONE SODIUM PHOSPHATE 4 MG: 4 INJECTION, SOLUTION INTRAMUSCULAR; INTRAVENOUS at 12:06

## 2024-02-07 RX ADMIN — CEFAZOLIN SODIUM 2 G: 2 INJECTION, SOLUTION INTRAVENOUS at 20:59

## 2024-02-07 RX ADMIN — PROPOFOL 20 MG: 10 INJECTION, EMULSION INTRAVENOUS at 12:12

## 2024-02-07 RX ADMIN — ESMOLOL HYDROCHLORIDE 90 MG: 10 INJECTION, SOLUTION INTRAVENOUS at 12:06

## 2024-02-07 RX ADMIN — TRANEXAMIC ACID 1000 MG: 1 INJECTION, SOLUTION INTRAVENOUS at 12:12

## 2024-02-07 RX ADMIN — ROCURONIUM BROMIDE 70 MG: 10 INJECTION, SOLUTION INTRAVENOUS at 12:08

## 2024-02-07 RX ADMIN — PROPOFOL 150 MG: 10 INJECTION, EMULSION INTRAVENOUS at 12:06

## 2024-02-07 RX ADMIN — SODIUM CHLORIDE, SODIUM LACTATE, POTASSIUM CHLORIDE, AND CALCIUM CHLORIDE: 600; 310; 30; 20 INJECTION, SOLUTION INTRAVENOUS at 11:55

## 2024-02-07 RX ADMIN — ONDANSETRON 4 MG: 2 INJECTION INTRAMUSCULAR; INTRAVENOUS at 14:22

## 2024-02-07 RX ADMIN — ROCURONIUM BROMIDE 10 MG: 10 INJECTION, SOLUTION INTRAVENOUS at 12:06

## 2024-02-07 RX ADMIN — DOCUSATE SODIUM 100 MG: 100 CAPSULE, LIQUID FILLED ORAL at 20:59

## 2024-02-07 RX ADMIN — LIDOCAINE HYDROCHLORIDE 40 MG: 20 INJECTION, SOLUTION EPIDURAL; INFILTRATION; INTRACAUDAL; PERINEURAL at 12:06

## 2024-02-07 RX ADMIN — CEFAZOLIN 2 G: 1 INJECTION, POWDER, FOR SOLUTION INTRAMUSCULAR; INTRAVENOUS at 12:15

## 2024-02-07 RX ADMIN — ESMOLOL HYDROCHLORIDE 90 MG: 10 INJECTION, SOLUTION INTRAVENOUS at 12:09

## 2024-02-07 RX ADMIN — ROCURONIUM BROMIDE 10 MG: 10 INJECTION, SOLUTION INTRAVENOUS at 13:06

## 2024-02-07 RX ADMIN — PROPOFOL 20 MG: 10 INJECTION, EMULSION INTRAVENOUS at 12:10

## 2024-02-07 RX ADMIN — Medication 100 MCG: at 12:22

## 2024-02-07 RX ADMIN — ACETAMINOPHEN 650 MG: 325 TABLET ORAL at 20:59

## 2024-02-07 RX ADMIN — CEFAZOLIN 2 G: 1 INJECTION, POWDER, FOR SOLUTION INTRAMUSCULAR; INTRAVENOUS at 12:06

## 2024-02-07 RX ADMIN — Medication 100 MCG: at 12:12

## 2024-02-07 RX ADMIN — POVIDONE-IODINE 1 APPLICATION: 5 SOLUTION TOPICAL at 09:37

## 2024-02-07 RX ADMIN — SUGAMMADEX 200 MG: 100 INJECTION, SOLUTION INTRAVENOUS at 14:30

## 2024-02-07 RX ADMIN — ATORVASTATIN CALCIUM 40 MG: 40 TABLET, FILM COATED ORAL at 21:37

## 2024-02-07 RX ADMIN — CARBOXYMETHYLCELLULOSE SODIUM 2 DROP: 5 SOLUTION/ DROPS OPHTHALMIC at 12:12

## 2024-02-07 RX ADMIN — DEXAMETHASONE SODIUM PHOSPHATE 4 MG: 4 INJECTION, SOLUTION INTRAMUSCULAR; INTRAVENOUS at 12:20

## 2024-02-07 RX ADMIN — FENTANYL CITRATE 100 MCG: 50 INJECTION, SOLUTION INTRAMUSCULAR; INTRAVENOUS at 12:06

## 2024-02-07 RX ADMIN — CARVEDILOL 6.25 MG: 6.25 TABLET, FILM COATED ORAL at 21:37

## 2024-02-07 SDOH — SOCIAL STABILITY: SOCIAL INSECURITY: ARE THERE ANY APPARENT SIGNS OF INJURIES/BEHAVIORS THAT COULD BE RELATED TO ABUSE/NEGLECT?: NO

## 2024-02-07 SDOH — SOCIAL STABILITY: SOCIAL INSECURITY: ABUSE: ADULT

## 2024-02-07 SDOH — SOCIAL STABILITY: SOCIAL INSECURITY: ARE YOU OR HAVE YOU BEEN THREATENED OR ABUSED PHYSICALLY, EMOTIONALLY, OR SEXUALLY BY ANYONE?: NO

## 2024-02-07 SDOH — SOCIAL STABILITY: SOCIAL INSECURITY: DOES ANYONE TRY TO KEEP YOU FROM HAVING/CONTACTING OTHER FRIENDS OR DOING THINGS OUTSIDE YOUR HOME?: NO

## 2024-02-07 SDOH — SOCIAL STABILITY: SOCIAL INSECURITY: HAVE YOU HAD THOUGHTS OF HARMING ANYONE ELSE?: NO

## 2024-02-07 SDOH — SOCIAL STABILITY: SOCIAL INSECURITY: WERE YOU ABLE TO COMPLETE ALL THE BEHAVIORAL HEALTH SCREENINGS?: YES

## 2024-02-07 SDOH — SOCIAL STABILITY: SOCIAL INSECURITY: HAS ANYONE EVER THREATENED TO HURT YOUR FAMILY OR YOUR PETS?: NO

## 2024-02-07 SDOH — SOCIAL STABILITY: SOCIAL INSECURITY: DO YOU FEEL ANYONE HAS EXPLOITED OR TAKEN ADVANTAGE OF YOU FINANCIALLY OR OF YOUR PERSONAL PROPERTY?: NO

## 2024-02-07 SDOH — SOCIAL STABILITY: SOCIAL INSECURITY: DO YOU FEEL UNSAFE GOING BACK TO THE PLACE WHERE YOU ARE LIVING?: NO

## 2024-02-07 ASSESSMENT — COGNITIVE AND FUNCTIONAL STATUS - GENERAL
MOBILITY SCORE: 24
DAILY ACTIVITIY SCORE: 24
PATIENT BASELINE BEDBOUND: NO

## 2024-02-07 ASSESSMENT — PAIN SCALES - GENERAL
PAINLEVEL_OUTOF10: 0 - NO PAIN
PAINLEVEL_OUTOF10: 2
PAINLEVEL_OUTOF10: 0 - NO PAIN
PAIN_LEVEL: 0
PAINLEVEL_OUTOF10: 0 - NO PAIN

## 2024-02-07 ASSESSMENT — ACTIVITIES OF DAILY LIVING (ADL)
DRESSING YOURSELF: INDEPENDENT
JUDGMENT_ADEQUATE_SAFELY_COMPLETE_DAILY_ACTIVITIES: YES
BATHING: INDEPENDENT
FEEDING YOURSELF: INDEPENDENT
HEARING - LEFT EAR: FUNCTIONAL
WALKS IN HOME: INDEPENDENT
ADEQUATE_TO_COMPLETE_ADL: YES
TOILETING: INDEPENDENT
GROOMING: INDEPENDENT
HEARING - RIGHT EAR: FUNCTIONAL
PATIENT'S MEMORY ADEQUATE TO SAFELY COMPLETE DAILY ACTIVITIES?: YES

## 2024-02-07 ASSESSMENT — PATIENT HEALTH QUESTIONNAIRE - PHQ9
1. LITTLE INTEREST OR PLEASURE IN DOING THINGS: NOT AT ALL
2. FEELING DOWN, DEPRESSED OR HOPELESS: NOT AT ALL
SUM OF ALL RESPONSES TO PHQ9 QUESTIONS 1 & 2: 0

## 2024-02-07 ASSESSMENT — LIFESTYLE VARIABLES
SUBSTANCE_ABUSE_PAST_12_MONTHS: NO
PRESCIPTION_ABUSE_PAST_12_MONTHS: NO
SKIP TO QUESTIONS 9-10: 0
HOW OFTEN DO YOU HAVE 6 OR MORE DRINKS ON ONE OCCASION: PATIENT DECLINED
HOW MANY STANDARD DRINKS CONTAINING ALCOHOL DO YOU HAVE ON A TYPICAL DAY: PATIENT DECLINED
HOW OFTEN DO YOU HAVE A DRINK CONTAINING ALCOHOL: NEVER
AUDIT-C TOTAL SCORE: -1
AUDIT-C TOTAL SCORE: -1

## 2024-02-07 NOTE — OP NOTE
Right Shoulder Total Arthroplasty Revision to Reverse Total Shoulder (R) Operative Note     Date: 2024  OR Location: [facility] OR    Name: Cam Hernandez, : 1950, Age: 73 y.o., MRN: [id number], Sex: male    Diagnosis  Pre-op Diagnosis     * Arthritis of right shoulder region [M19.011] Post-op Diagnosis     * Arthritis of right shoulder region [M19.011]     Procedures  Right Shoulder Total Arthroplasty Revision to Reverse Total Shoulder  34858 - NE [place] SHOULDER ARTHRPLSTY HUMERAL&GLENOID COMPNT      Surgeons      * Romario Zavala - Primary    Resident/Fellow/Other Assistant:  Surgeon(s) and Role:     * Beck Aragon MD - Resident - Assisting  Marcella chow cnp    Procedure Summary  Anesthesia: Consult  ASA: III  Anesthesia Staff: Anesthesiologist: Turner Prasad MD  CRNA: ANIBAL Chaney-CRNA  C-AA: ODETTE Russ  Estimated Blood Loss: 100mL  Intra-op Medications:   Administrations occurring from 1130 to 1400 on 24:   Medication Name Total Dose   sodium chloride 0.9 % irrigation solution 1,000 mL   vancomycin (Vancocin) vial for injection 1 g              Anesthesia Record               Intraprocedure I/O Totals          Intake    LR bolus 900.00 mL    Tranexamic Acid 0.00 mL    The total shown is the total volume documented since Anesthesia Start was filed.    Total Intake 900 mL       Output    Est. Blood Loss 100 mL    Total Output 100 mL       Net    Net Volume 800 mL          Specimen:   ID Type Source Tests Collected by Time   1 : RIGHT SHOULDER EXPLANT, PLEASE FORWARD TO ORTHOPAEDIC IMPLANT RETRIEVAL LAB Tissue SHOULDER HARDWARE RIGHT SURGICAL PATHOLOGY EXAM Romario Zavala MD 2024 1401   A : SHOULDER CULTURE #1 Swab SHOULDER ARTHROPLASTY RIGHT TISSUE/WOUND CULTURE/SMEAR Romario Zavala MD 2024 1319   B : SHOULDER CULTURE #2 Swab SHOULDER ARTHROPLASTY RIGHT TISSUE/WOUND CULTURE/SMEAR Romario Zavala MD 2024 1321   C : SHOULDER CULTURE #3 Swab SHOULDER ARTHROPLASTY  RIGHT TISSUE/WOUND CULTURE/SMEAR Romario Zavala MD 2/7/2024 1321   D : SHOULDER CULTURE #4 Swab SHOULDER ARTHROPLASTY RIGHT TISSUE/WOUND CULTURE/SMEAR Romario Zavala MD 2/7/2024 1321   E : SHOULDER CULTURE #5 Swab SHOULDER ARTHROPLASTY RIGHT TISSUE/WOUND CULTURE/SMEAR Romario Zavala MD 2/7/2024 1322   F : SHOULDER CULTURE #6 Swab SHOULDER ARTHROPLASTY RIGHT TISSUE/WOUND CULTURE/SMEAR Romario Zavala MD 2/7/2024 1322        Staff:   Circulator: Demetria Hansen RN; Jamal Cifuentes RN  Relief Circulator: Violetta Lema RN  Relief Scrub: Johnny Cha RN  Scrub Person: Niharika Monterroso; Bridget Miles         Drains and/or Catheters:   Closed/Suction Drain Right;Anterior Shoulder Other (Comment) 10 Fr. (Active)       Tourniquet Times:         Implants:  Implants       Type Name Action Serial No.       GLENOSPHERE Implanted      Joint IMPLANT, HUMERAL ADAPTER TRAY, +0 - [id number] - [id number] Implanted [id number]      HUMERAL LINER Implanted [id number]     Joint SCREW, TORQUE DEFINING KIT - [id number] - [id number] Implanted [id number]              Findings: Consistent diagnosis patient dislocated shoulder preoperatively had a sensation intact axillary nerve.  He appeared to be firing deltoid musculature    Indications: Cam Hernandez is an 73 y.o. male who is having surgery for Arthritis of right shoulder region [M19.011].  Dislocation of the shoulder    The patient was seen in the preoperative area. The risks, benefits, complications, treatment options, non-operative alternatives, expected recovery and outcomes were discussed with the patient. The possibilities of reaction to medication, pulmonary aspiration, injury to surrounding structures, bleeding, recurrent infection, the need for additional procedures, failure to diagnose a condition, and creating a complication requiring transfusion or operation were discussed with the patient. The patient concurred with the proposed plan, giving informed consent.  The site of  surgery was properly noted/marked if necessary per policy. The patient has been actively warmed in preoperative area. Preoperative antibiotics have been ordered and given within 1 hours of incision. Venous thrombosis prophylaxis have been ordered including bilateral sequential compression devices    Procedure Details: Very pleasant patient who suffered severe bone loss both humerus and glenoid.  He undergone prior revision surgery with custom-made implants.  Unfortunately had shoulder instability.  Custom implant had to be made respect alternatives of further dislocation nerve injury infection bleeding no improvement pain no improvement in function with discussed at length he understood and wished to proceed.  His operative report date of surgery was seen in preop holding area identify correct patient right shoulder identified marked operative site all risk-benefit alternatives were discussed at length again he wished to proceed.  Interscalene nerve block was performed.  Using the operating room [facility].  There he was sedated intubated after we performed a huddle.  Trans-Otilio acid was given antibiotics were dosed appropriately.  Patient was enrolled in the [employer] sequencing study.  Cultures x 6 for extended incubation were taken.  He was prepped draped standard fashion 60 degree beachchair position all bony prominences were well-padded.  He then made a standard deltopectoral incision.  We took this down to the deltopectoral incision.  We identified prior nonabsorbable sutures.  We opened up the shoulder after aspirating the joint.  Clear serosanguineous fluid was aspirated.  We dislocated the shoulder removed the modular components.  We then turned our attention to the glenoid.  We had to do circumferential release.  We removed some of the inferior capsule.  Posterior capsule was also excised as well as superior and anterior capsule.  This point we used the osteotome to disengage the Peterson taper  remove the 36 mm head.  We trialed a 46 mm head I felt that give us the best ability.  Impacted the 46+4 custom glenosphere into place.  We then turned attention back to the acute humerus.  We trialed again.  I felt I had more stability with a 2.5 mm not constrained polyethylene on the HRP.  Copiously irrigated the wound.  Placed her implants in standard fashion relocated the shoulder.  Excellent stability and was quite tight.  The deltoid was also tight no signs impingement up to 45 degrees external rotation no signs of instability laterally with a lateral shock or anteriorly with internal rotation.  Copiously irrigated the wound we placed vancomycin powder as well as a drain.  Closed the deep space with nonabsorbable suture.  Standard revision closure was performed.  Please note that we will be billing for my nurse practitioner's first assist as she was critical in the center for successful completion of the case including positioning of the body, retraction, suture management, placement of the implants and wound closure. There was no qualified resident available for the case entirely  Complications:  None; patient tolerated the procedure well.    Disposition: PACU - hemodynamically stable.  Condition: stable         Additional Details: None    Attending Attestation: I was present and scrubbed for the key portions of the procedure.    Romario Zavala  Phone Number: [phone]

## 2024-02-07 NOTE — ANESTHESIA PROCEDURE NOTES
Airway  Date/Time: 2/7/2024 12:12 PM  Urgency: elective    Airway not difficult    Staffing  Performed: attending   Authorized by: Turner Prasad MD    Performed by: ODETTE Russ  Patient location during procedure: OR    Indications and Patient Condition  Indications for airway management: anesthesia  Spontaneous ventilation: present  Sedation level: deep  Preoxygenated: yes  Patient position: sniffing  Mask difficulty assessment: 1 - vent by mask    Final Airway Details  Final airway type: endotracheal airway      Successful airway: ETT  Cuffed: yes   Successful intubation technique: video laryngoscopy  Facilitating devices/methods: cricoid pressure  Endotracheal tube insertion site: oral  Blade size: #4  ETT size (mm): 7.5  Cormack-Lehane Classification: grade IIa - partial view of glottis  Placement verified by: chest auscultation and capnometry   Measured from: teeth  ETT to teeth (cm): 24  Number of attempts at approach: 2

## 2024-02-07 NOTE — ANESTHESIA PREPROCEDURE EVALUATION
Patient: Cam Hernandez    Procedure Information       Date/Time: 02/07/24 1130    Procedure: Right Shoulder Total Arthroplasty Revision to Reverse Total Shoulder (Right: Shoulder)    Location: [facility] A OR 11 / [facility] A OR    Surgeons: Romario Zavala MD            Relevant Problems   Anesthesia (within normal limits)      Cardiovascular   (+) Angina pectoris (CMS/HCC)   (+) CAD (coronary artery disease)   (+) Carotid artery stenosis   (+) Coronary arteriosclerosis   (+) Hyperlipidemia   (+) Hypertension   (+) PAD (peripheral artery disease) (CMS/HCC)   (+) Stented coronary artery (2016)      Endocrine   (+) Severe obesity (CMS/HCC)      GI   (+) Esophageal reflux      /Renal   (+) Acute kidney injury (CMS/HCC)   (+) Renal insufficiency      Neuro/Psych   (+) Carotid artery stenosis      Pulmonary   (+) Centrilobular emphysema (CMS/HCC)   (+) Pneumonia due to COVID-19 virus (2020 hosp 5d)      Hematology   (+) Thrombocytopenia (CMS/HCC)      Eyes, Ears, Nose, and Throat   (+) Hearing loss   (+) Sensorineural hearing loss (SNHL), bilateral      Infectious Disease   (+) Pneumonia due to COVID-19 virus (2020 hosp 5d)      Other   (+) Shoulder arthritis       Clinical information reviewed:   Tobacco  Allergies  Meds   Med Hx  Surg Hx   Fam Hx  Soc Hx        NPO Detail:  NPO/Void Status  Date of Last Liquid: 02/07/24  Time of Last Liquid: 0700  Date of Last Solid: 02/06/24  Time of Last Solid: 1700         Physical Exam    Airway  Mallampati: II     Cardiovascular    Dental    Pulmonary    Abdominal            Anesthesia Plan    History of general anesthesia?: yes  History of complications of general anesthesia?: no    ASA 3     general and regional     intravenous induction   Anesthetic plan and risks discussed with patient.

## 2024-02-07 NOTE — ANESTHESIA PROCEDURE NOTES
Peripheral Block    Patient location during procedure: pre-op  Start time: 2/7/2024 11:30 AM  End time: 2/7/2024 11:45 AM  Reason for block: procedure for pain, at surgeon's request and post-op pain management  Staffing  Performed: attending   Authorized by: Turner Prasad MD    Performed by: Turner Prasad MD  Preanesthetic Checklist  Completed: patient identified, IV checked, site marked, risks and benefits discussed, surgical consent, monitors and equipment checked, pre-op evaluation and timeout performed   Timeout performed at: 2/7/2024 11:30 AM  Peripheral Block  Patient position: sitting  Prep: ChloraPrep  Patient monitoring: heart rate and continuous pulse ox  Block type: interscalene  Laterality: right  Injection technique: single-shot  Guidance: ultrasound guided  Local infiltration: lidocaine  Needle  Needle type: short-bevel   Needle gauge: 22 G  Needle length: 5 cm  Needle localization: ultrasound guidance     image stored in chart  Test dose: negative  Assessment  Injection assessment: negative aspiration for heme, no paresthesia on injection, incremental injection and local visualized surrounding nerve on ultrasound  Paresthesia pain: none  Heart rate change: no  Slow fractionated injection: yes  Additional Notes  Bupivacaine 0.375% 30ml + lidocaine 2% 10ml + decadron 4mg + NaHCO3 1ml used for block.  Versed 2mg iv given.  Pt crissy proc well.

## 2024-02-07 NOTE — ANESTHESIA POSTPROCEDURE EVALUATION
Patient: Cam Hernandez    Procedure Summary       Date: 02/07/24 Room / Location: [facility] A OR 11 / Virtual U A OR    Anesthesia Start: 1155 Anesthesia Stop: 1447    Procedure: Right Shoulder Total Arthroplasty Revision to Reverse Total Shoulder (Right: Shoulder) Diagnosis:       Arthritis of right shoulder region      (Arthritis of right shoulder region [M19.011])    Surgeons: Romario Zavala MD Responsible Provider: Turner Prasad MD    Anesthesia Type: general, regional ASA Status: 3            Anesthesia Type: general, regional    Vitals Value Taken Time   /64 02/07/24 1446   Temp 37.3 02/07/24 1448   Pulse 55 02/07/24 1448   Resp 19 02/07/24 1448   SpO2 94 % 02/07/24 1448   Vitals shown include unvalidated device data.    Anesthesia Post Evaluation    Patient location during evaluation: bedside  Patient participation: complete - patient cannot participate  Level of consciousness: awake  Pain score: 0  Pain management: adequate  Airway patency: patent  Cardiovascular status: acceptable  Respiratory status: acceptable  Hydration status: acceptable  Postoperative Nausea and Vomiting: none        No notable events documented.

## 2024-02-08 ENCOUNTER — APPOINTMENT (OUTPATIENT)
Dept: CARDIOLOGY | Facility: HOSPITAL | Age: 74
DRG: 483 | End: 2024-02-08
Payer: MEDICARE

## 2024-02-08 VITALS
OXYGEN SATURATION: 95 % | SYSTOLIC BLOOD PRESSURE: 137 MMHG | RESPIRATION RATE: 18 BRPM | BODY MASS INDEX: 36.33 KG/M2 | HEART RATE: 59 BPM | HEIGHT: 67 IN | DIASTOLIC BLOOD PRESSURE: 53 MMHG | TEMPERATURE: 98.2 F | WEIGHT: 231.48 LBS

## 2024-02-08 LAB
ANION GAP SERPL CALC-SCNC: 13 MMOL/L (ref 10–20)
BUN SERPL-MCNC: 20 MG/DL (ref 6–23)
CALCIUM SERPL-MCNC: 8.3 MG/DL (ref 8.6–10.3)
CHLORIDE SERPL-SCNC: 104 MMOL/L (ref 98–107)
CO2 SERPL-SCNC: 24 MMOL/L (ref 21–32)
CREAT SERPL-MCNC: 1.23 MG/DL (ref 0.5–1.3)
EGFRCR SERPLBLD CKD-EPI 2021: 62 ML/MIN/1.73M*2
ERYTHROCYTE [DISTWIDTH] IN BLOOD BY AUTOMATED COUNT: 13.2 % (ref 11.5–14.5)
GLUCOSE SERPL-MCNC: 173 MG/DL (ref 74–99)
HCT VFR BLD AUTO: 41.9 % (ref 41–52)
HGB BLD-MCNC: 13.5 G/DL (ref 13.5–17.5)
LABORATORY COMMENT REPORT: NORMAL
MCH RBC QN AUTO: 29.5 PG (ref 26–34)
MCHC RBC AUTO-ENTMCNC: 32.2 G/DL (ref 32–36)
MCV RBC AUTO: 92 FL (ref 80–100)
NRBC BLD-RTO: 0 /100 WBCS (ref 0–0)
PATH REPORT.FINAL DX SPEC: NORMAL
PATH REPORT.GROSS SPEC: NORMAL
PATH REPORT.RELEVANT HX SPEC: NORMAL
PATH REPORT.TOTAL CANCER: NORMAL
PLATELET # BLD AUTO: 179 X10*3/UL (ref 150–450)
POTASSIUM SERPL-SCNC: 3.9 MMOL/L (ref 3.5–5.3)
RBC # BLD AUTO: 4.58 X10*6/UL (ref 4.5–5.9)
SODIUM SERPL-SCNC: 137 MMOL/L (ref 136–145)
WBC # BLD AUTO: 12.2 X10*3/UL (ref 4.4–11.3)

## 2024-02-08 PROCEDURE — 99233 SBSQ HOSP IP/OBS HIGH 50: CPT | Performed by: STUDENT IN AN ORGANIZED HEALTH CARE EDUCATION/TRAINING PROGRAM

## 2024-02-08 PROCEDURE — 2500000001 HC RX 250 WO HCPCS SELF ADMINISTERED DRUGS (ALT 637 FOR MEDICARE OP): Performed by: NURSE PRACTITIONER

## 2024-02-08 PROCEDURE — 85027 COMPLETE CBC AUTOMATED: CPT

## 2024-02-08 PROCEDURE — 97165 OT EVAL LOW COMPLEX 30 MIN: CPT | Mod: GO

## 2024-02-08 PROCEDURE — 2500000004 HC RX 250 GENERAL PHARMACY W/ HCPCS (ALT 636 FOR OP/ED): Performed by: STUDENT IN AN ORGANIZED HEALTH CARE EDUCATION/TRAINING PROGRAM

## 2024-02-08 PROCEDURE — 80048 BASIC METABOLIC PNL TOTAL CA: CPT

## 2024-02-08 PROCEDURE — 97535 SELF CARE MNGMENT TRAINING: CPT | Mod: GO

## 2024-02-08 PROCEDURE — 2500000001 HC RX 250 WO HCPCS SELF ADMINISTERED DRUGS (ALT 637 FOR MEDICARE OP)

## 2024-02-08 PROCEDURE — 93005 ELECTROCARDIOGRAM TRACING: CPT

## 2024-02-08 PROCEDURE — 2500000004 HC RX 250 GENERAL PHARMACY W/ HCPCS (ALT 636 FOR OP/ED): Performed by: NURSE PRACTITIONER

## 2024-02-08 RX ORDER — DOCUSATE SODIUM 100 MG/1
100 CAPSULE, LIQUID FILLED ORAL 2 TIMES DAILY
Qty: 28 CAPSULE | Refills: 0 | Status: SHIPPED | OUTPATIENT
Start: 2024-02-08 | End: 2024-02-22

## 2024-02-08 RX ORDER — NAPROXEN SODIUM 220 MG/1
81 TABLET, FILM COATED ORAL DAILY
Qty: 14 TABLET | Refills: 0 | Status: SHIPPED | OUTPATIENT
Start: 2024-02-08 | End: 2024-02-22

## 2024-02-08 RX ORDER — POLYETHYLENE GLYCOL 3350 17 G/17G
17 POWDER, FOR SOLUTION ORAL DAILY
Status: DISCONTINUED | OUTPATIENT
Start: 2024-02-08 | End: 2024-02-08 | Stop reason: HOSPADM

## 2024-02-08 RX ORDER — DOCUSATE SODIUM 100 MG/1
100 CAPSULE, LIQUID FILLED ORAL 2 TIMES DAILY
Qty: 28 CAPSULE | Refills: 0 | Status: SHIPPED | OUTPATIENT
Start: 2024-02-08 | End: 2024-02-08 | Stop reason: SDUPTHER

## 2024-02-08 RX ORDER — HYDROCHLOROTHIAZIDE 12.5 MG/1
25 TABLET ORAL DAILY
COMMUNITY
Start: 2024-02-08 | End: 2024-04-06

## 2024-02-08 RX ORDER — ALLOPURINOL 300 MG/1
150 TABLET ORAL 3 TIMES WEEKLY
COMMUNITY
Start: 2024-02-09 | End: 2024-05-16 | Stop reason: DRUGHIGH

## 2024-02-08 RX ADMIN — ASPIRIN 81 MG: 81 TABLET, CHEWABLE ORAL at 10:40

## 2024-02-08 RX ADMIN — ACETAMINOPHEN 650 MG: 325 TABLET ORAL at 10:40

## 2024-02-08 RX ADMIN — AMLODIPINE BESYLATE 10 MG: 10 TABLET ORAL at 10:40

## 2024-02-08 RX ADMIN — ACETAMINOPHEN 650 MG: 325 TABLET ORAL at 03:11

## 2024-02-08 RX ADMIN — POLYETHYLENE GLYCOL 3350 17 G: 17 POWDER, FOR SOLUTION ORAL at 10:40

## 2024-02-08 RX ADMIN — DOCUSATE SODIUM 100 MG: 100 CAPSULE, LIQUID FILLED ORAL at 10:40

## 2024-02-08 RX ADMIN — CARVEDILOL 6.25 MG: 6.25 TABLET, FILM COATED ORAL at 10:40

## 2024-02-08 RX ADMIN — CEFAZOLIN SODIUM 2 G: 2 INJECTION, SOLUTION INTRAVENOUS at 03:11

## 2024-02-08 ASSESSMENT — PAIN - FUNCTIONAL ASSESSMENT: PAIN_FUNCTIONAL_ASSESSMENT: 0-10

## 2024-02-08 ASSESSMENT — COGNITIVE AND FUNCTIONAL STATUS - GENERAL: DAILY ACTIVITIY SCORE: 24

## 2024-02-08 ASSESSMENT — ACTIVITIES OF DAILY LIVING (ADL)
BATHING_ASSISTANCE: INDEPENDENT
HOME_MANAGEMENT_TIME_ENTRY: 15
ADL_ASSISTANCE: INDEPENDENT

## 2024-02-08 ASSESSMENT — PAIN SCALES - GENERAL: PAINLEVEL_OUTOF10: 0 - NO PAIN

## 2024-02-08 NOTE — CARE PLAN
The patient's goals for the shift include pain control    The clinical goals for the shift include pain control/safety

## 2024-02-08 NOTE — CARE PLAN
The patient's goals for the shift include pain control    The clinical goals for the shift include pain control/safety    Problem: Pain  Goal: Walks with improved pain control throughout the shift  Outcome: Progressing     Problem: Fall/Injury  Goal: Not fall by end of shift  Outcome: Progressing

## 2024-02-08 NOTE — PROGRESS NOTES
Occupational Therapy    Evaluation/Treatment    Patient Name: Cam Hernandez  MRN: [id number]  : 1950  Today's Date: 24  Time Calculation  Start Time: 930  Stop Time: 1000  Time Calculation (min): 30 min       Assessment:  OT Assessment:  (OT Eval complete, patient is s/p R  Reverse TSR. Patient does well overall with transfers and ADLs. Paitent has good understanding of precautions and how to don/doff sling. No further OT needs, will discontinue OT order.)  Prognosis: Good  Barriers to Discharge: None  Evaluation/Treatment Tolerance: Patient tolerated treatment well  Medical Staff Made Aware: Yes  End of Session Communication: Bedside nurse  End of Session Patient Position: Up in chair, Alarm on  Prognosis: Good  Barriers to Discharge: None  Evaluation/Treatment Tolerance: Patient tolerated treatment well  Medical Staff Made Aware: Yes  Plan:  No Skilled OT: At baseline function  OT Frequency: OT eval only  OT Discharge Recommendations: No further acute OT  OT - OK to Discharge: Yes       Subjective   Current Problem:  1. Shoulder arthritis        2. Arthritis of right shoulder region  Tissue/Wound Culture/Smear    Tissue/Wound Culture/Smear    Tissue/Wound Culture/Smear    Tissue/Wound Culture/Smear    Tissue/Wound Culture/Smear    Tissue/Wound Culture/Smear    Tissue/Wound Culture/Smear    Tissue/Wound Culture/Smear    Tissue/Wound Culture/Smear    Tissue/Wound Culture/Smear    Tissue/Wound Culture/Smear    Tissue/Wound Culture/Smear    Surgical Pathology Exam    Surgical Pathology Exam      3. Gout, unspecified  allopurinol (Zyloprim) 300 mg tablet      4. Essential (primary) hypertension  hydroCHLOROthiazide (HYDRODiuril) 12.5 mg tablet      5. Dislocation, shoulder closed, right, initial encounter  docusate sodium (Colace) 100 mg capsule    aspirin 81 mg chewable tablet    DISCONTINUED: docusate sodium (Colace) 100 mg capsule        General:   OT Received On: 24  General  Reason for  Referral:  (s/p R Reverse TSR)  Past Medical History Relevant to Rehab: Hyperlipidemia, Htn, Angina Pectoris, Gout, Hearing Loss, Renal Insufficiency.  Family/Caregiver Present: Yes  Caregiver Feedback: Spouse present during session.  Prior to Session Communication: Bedside nurse  Patient Position Received: Bed, 2 rail up, Alarm on  General Comment: Patient doing well overall, is independent with ADLs and understands reverse TSR precautions.  Precautions:  Medical Precautions:  (R Reverse Total Shoulder Precautions, no movement of shoulder, distal ROM only. NWB R UE.)    Pain:  Pain Assessment  Pain Assessment: 0-10  Pain Score: 0 - No pain    Objective   Cognition:  Overall Cognitive Status: Within Functional Limits     Home Living:  Type of Home:  (2 story home with 2 steps, R HR, Bed/bath on 2nd floor with 12 steps, L HR, walk in shower, raised toilet seat.)  Lives With: Spouse  Prior Function:  Level of [place]: Independent with ADLs and functional transfers  Receives Help From: Family  ADL Assistance: Independent  Homemaking Assistance: Independent  Ambulatory Assistance: Independent (No assistive device)  Hand Dominance: Right  IADL History:  Homemaking Responsibilities:  (Spouse is primary homemaker)  ADL:  Eating Assistance: Independent  Grooming Assistance: Independent  Bathing Assistance: Independent  UE Dressing Assistance: Independent  LE Dressing Assistance: Independent  Toileting Assistance with Device: Independent  Functional Assistance: Independent  Activities of Daily Living:      Grooming  Grooming Level of Assistance: Independent  Grooming Where Assessed: Standing sinkside  UE Dressing  UE Dressing Level of Assistance: Independent  UE Dressing Where Assessed: Edge of bed    LE Dressing  LE Dressing: Yes  Pants Level of Assistance: Independent  Sock Level of Assistance: Independent  LE Dressing Where Assessed: Edge of bed  Activity Tolerance:  Endurance: Endurance does not limit participation  in activity  Activity Tolerance Comments: Tolerates session well  Bed Mobility/Transfers: Bed Mobility  Bed Mobility: Yes  Bed Mobility 1  Bed Mobility 1: Supine to sitting  Level of Assistance 1: Independent    Transfers  Transfer: Yes  Transfer 1  Transfer From 1: Bed to  Transfer to 1: Chair with arms  Technique 1: Sit to stand  Transfer Device 1:  (no assistive device)  Transfer Level of Assistance 1: Independent    Sitting Balance:  Static Sitting Balance  Static Sitting-Comment/Number of Minutes: Normal Balance  Dynamic Sitting Balance  Dynamic Sitting-Comments: Normal Balance  Standing Balance:  Static Standing Balance  Static Standing-Comment/Number of Minutes: Normal Balance  Dynamic Standing Balance  Dynamic Standing-Comments: Normal Balance     Therapy/Activity: Therapeutic Exercise  Therapeutic Exercise Performed:  (Patient performed 10 reps  Distal ROM R UE Elbow to digits.)  Strength:  Strength Comments: R UE- N/E due to shoulder surgery, L UE WFL    Outcome Measures: [facility] Daily Activity  Putting on and taking off regular lower body clothing: None  Bathing (including washing, rinsing, drying): None  Putting on and taking off regular upper body clothing: None  Toileting, which includes using toilet, bedpan or urinal: None  Taking care of personal grooming such as brushing teeth: None  Eating Meals: None  Daily Activity - Total Score: 24    OT Eval Complete, patient is s/p R Reverse TSR. Patient is independent with all ADLs and transfers. No skilled OT needs after discharge. Will Discontinue OT order.

## 2024-02-08 NOTE — PROGRESS NOTES
Cam Hernandez is a 73 y.o. male on day 1 of admission presenting with Arthritis of right shoulder region.      Subjective   SEBASTIAN. Minimal pain, block worn off. Denies N/T. Tolerating diet, +void.      .  Output by Drain (mL) 02/06/24 0700 - 02/06/24 1859 02/06/24 1900 - 02/07/24 0659 02/07/24 0700 - 02/07/24 1859 02/07/24 1900 - 02/08/24 0659 02/08/24 0700 - 02/08/24 0955   Closed/Suction Drain Right;Anterior Shoulder Other (Comment) 10 Fr.   30 80         Objective     PE:  Constitutional: A&Ox3, calm and cooperative, NAD  Eyes: EOMI, clear sclera   Cardiovascular: Normal rate and regular rhythm. No murmurs  Respiratory/Thorax: CTAB, on RA  Genitourinary: Voiding independently   Musculoskeletal: RUE dressing CDI, sling in place. fires AIN/PIN/U. SILT M/R/U. 2+ R pulse. <2 CRT. Compartments soft, compressible. HV with sanguinous output  Extremities: No peripheral edema  Neurological: A&Ox3, No focal deficits   Psychological: Appropriate mood and behavior      Last Recorded Vitals  Vitals:    02/07/24 1937 02/08/24 0008 02/08/24 0307 02/08/24 0820   BP: 145/66 133/68 118/63 137/53   Pulse: 72 61 68 59   Resp: 18 18 18 18   Temp: 37 °C (98.6 °F) 36.6 °C (97.9 °F) 36.9 °C (98.5 °F) 36.8 °C (98.2 °F)   TempSrc:       SpO2:    95%   Weight:       Height:             Relevant Results    Imaging:     .=== 02/07/24 ===    XR SHOULDER 1 VIEW RIGHT    - Impression -  Status post revision right reverse shoulder arthroplasty, with  expected postoperative findings.      MACRO:  None    Signed by: Aliza Borjas 2/7/2024 3:38 PM  Dictation workstation:   [id number]   .=== 08/21/23 ===    CT UPPER EXT WO/CONTRAST    - Impression -  1. Status post revision right total shoulder arthroplasty with  antibiotic cement as described above. There is interval increase in  osteolysis of the glenoid component with increased lucency about the  glenoid cement compared to prior CT examination dated 11/28/2022.  Small volume glenohumeral joint  effusion.  2. Mild acromioclavicular joint arthrosis.         Lab Results:   Lab Results   Component Value Date    WBC 12.2 (H) 02/08/2024    HGB 13.5 02/08/2024    HCT 41.9 02/08/2024    MCV 92 02/08/2024     02/08/2024     Lab Results   Component Value Date    GLUCOSE 173 (H) 02/08/2024    CALCIUM 8.3 (L) 02/08/2024     02/08/2024    K 3.9 02/08/2024    CO2 24 02/08/2024     02/08/2024    BUN 20 02/08/2024    CREATININE 1.23 02/08/2024         Estimated Creatinine Clearance: 60.9 mL/min (by C-G formula based on SCr of 1.23 mg/dL).  C-Reactive Protein   Date/Time Value Ref Range Status   02/07/2024 09:31 AM 0.39 <1.00 mg/dL Final         Assessment/Plan   Cam Hernandez is a 73 y.o. male that has recently underwent multiple surgeries on the right shoulder, most recently a right rTSA 11/15/23 that was complicated by prosthetic dislocation.     Now POD1 rTSA revision   Ortho/ Musculoskeletal:  Denies numbness, dressing C/D/I, able to wiggle fingers, neurovascularly intact, <CRT, 2+ radial pulse  -NWB LUE, hand/elbow/wrist ROM okay   -OT to provide exercises  -Ice to affected area   - dressing can be removed after POD2  - CANNOT shower until POD4  - HV removed without difficulty. Tip intact. Instructions given on wound care    Neuro: Acute postop pain as expected - well controlled on current medication regimen   -Continue current pain regimen     CV: BP stable  Hx: carotid artery stenosis S/P endarterectomy, chronic thoracic aortic dissection, CAD S/P stent, HTN, HLD, PAD  -Continue to monitor vital signs     Resp: CTAB, on RA  Hx: sleep apnea   -IS Q 1 hour while awake     GI: Tolerating diet  Hx: PONV, GERD  -Regular diet   -PRN Antiemetic   -Stool softener/ laxative     : Voiding independently   Hx: renal insufficiency   - Cr. 1.23  - monitor I and Os    Heme: H/H 13.5/41.9  -DVT proph: SCDs/ TEDs   -ASA 81mg BID       Endo:   - no hx of issues    ID: Afebrile, wbc 12.2  -Monitor for s/s  of infection   - ancef x 3, completed.      Dispo: discussed with Dr. Zavala. Anticipate discharge today    I spent 35 minutes in the professional and overall care of this patient.      Maura Muller PA-C

## 2024-02-08 NOTE — NURSING NOTE

## 2024-02-08 NOTE — NURSING NOTE
Interdisciplinary team present: NP, PT, NM, CC, SW, Orthopedic Coordinator, and bedside RN.  Pain - controlled  Nausea - none  Discharge barrier - none  Discharge plan - home  Discharge date/time -  2/5/24

## 2024-02-08 NOTE — DISCHARGE SUMMARY
Discharge Diagnosis  Arthritis of right shoulder region    Issues Requiring Follow-Up  S/P right reverse shoulder replacement revision     Test Results Pending At Discharge  Pending Labs       Order Current Status    Surgical Pathology Exam In process    Tissue/Wound Culture/Smear In process    Tissue/Wound Culture/Smear In process    Tissue/Wound Culture/Smear Preliminary result    Tissue/Wound Culture/Smear Preliminary result    Tissue/Wound Culture/Smear Preliminary result    Tissue/Wound Culture/Smear Preliminary result            Hospital Course   Briefly, the patient is a 73 year-old male with multiple prior surgeries on right shoulder complicated by poor bone quality and dislocation, now S/P right reverse shoulder replacement revision      HOSPITAL COURSE: The patient underwent right rTSA revision on 2/7/24 which patient tolerated well and remained stable in the postoperative period. On postoperative day #1, patient was tolerating a diet, and remained afebrile with stable vital signs. Patient was ordered oral and intravenous narcotics for pain control.  On day of discharge patient was tolerating a diet well, afebrile with stable vital signs and lab values, and had adequate pain control. The wound was clean and dry. Patient was instructed to follow up in the office within 2 weeks and was given prescription for oxycodone for pain. Aspirin 81 mg daily was prescribed for DVT prophylaxis. Colace was prescribed as needed for constipation.       Pertinent Physical Exam At Time of Discharge  PE:  Constitutional: A&Ox3, calm and cooperative, NAD  Eyes: EOMI, clear sclera   Cardiovascular: Normal rate and regular rhythm. No murmurs  Respiratory/Thorax: CTAB, on RA  Genitourinary: Voiding independently   Musculoskeletal: RUE dressing CDI, sling in place. fires AIN/PIN/U. SILT M/R/U. 2+ R pulse. <2 CRT. Compartments soft, compressible. HV removed without difficulty with tip intact  Extremities: No peripheral edema,  contusions, or wounds  Neurological: A&Ox3, No focal deficits   Psychological: Appropriate mood and behavior      Home Medications     Medication List      START taking these medications     aspirin 81 mg chewable tablet; Chew 1 tablet (81 mg) once daily for 14   days.; Replaces: aspirin 81 mg capsule   docusate sodium 100 mg capsule; Commonly known as: Colace; Take 1   capsule (100 mg) by mouth 2 times a day for 14 days.     CONTINUE taking these medications     allopurinol 300 mg tablet; Commonly known as: Zyloprim; Start taking on:   February 9, 2024   amLODIPine 10 mg tablet; Commonly known as: Norvasc; TAKE 1 TABLET BY   MOUTH EVERY DAY FOR BLOOD PRESSURE   ascorbic acid (vitamin C) 500 mg capsule   atorvastatin 80 mg tablet; Commonly known as: Lipitor; TAKE 1/2 TABLET   ONCE DAILY   carvedilol 6.25 mg tablet; Commonly known as: Coreg   cholecalciferol 125 MCG (5000 UT) capsule; Commonly known as: Vitamin   D-3   coenzyme Q-10 100 mg capsule   cyanocobalamin 1,000 mcg tablet; Commonly known as: Vitamin B-12   finasteride 1 mg tablet; Commonly known as: Propecia   hydroCHLOROthiazide 12.5 mg tablet; Commonly known as: HYDRODiuril   sildenafil 50 mg tablet; Commonly known as: Viagra; Take 1 tablet daily   as needed/directed.  Good Rx please   valsartan 320 mg tablet; Commonly known as: Diovan; TAKE 1 TABLET BY   MOUTH EVERY DAY   ZINC ORAL     STOP taking these medications     aspirin 81 mg capsule; Replaced by: aspirin 81 mg chewable tablet   chlorhexidine 0.12 % solution; Commonly known as: Peridex       Outpatient Follow-Up  Future Appointments   Date Time Provider Department [place]   2/27/2024  9:30 AM ANIBAL Reece-CNP [id number]   5/16/2024  8:30 AM Demetrio Morejon MD [id number] [facility]       Maura Muller PA-C

## 2024-02-14 LAB
BACTERIA SPEC CULT: NORMAL
GRAM STN SPEC: NORMAL

## 2024-02-19 DIAGNOSIS — T84.028S: ICD-10-CM

## 2024-02-19 DIAGNOSIS — M19.011 ARTHRITIS OF RIGHT SHOULDER REGION: Primary | ICD-10-CM

## 2024-02-19 DIAGNOSIS — Z96.619: ICD-10-CM

## 2024-02-20 ENCOUNTER — APPOINTMENT (OUTPATIENT)
Dept: ORTHOPEDIC SURGERY | Facility: HOSPITAL | Age: 74
End: 2024-02-20
Payer: MEDICARE

## 2024-02-20 ENCOUNTER — OFFICE VISIT (OUTPATIENT)
Dept: ORTHOPEDIC SURGERY | Facility: HOSPITAL | Age: 74
End: 2024-02-20
Payer: MEDICARE

## 2024-02-20 ENCOUNTER — HOSPITAL ENCOUNTER (OUTPATIENT)
Dept: RADIOLOGY | Facility: HOSPITAL | Age: 74
Discharge: HOME | End: 2024-02-20
Payer: MEDICARE

## 2024-02-20 VITALS — HEIGHT: 67 IN | BODY MASS INDEX: 35.31 KG/M2 | WEIGHT: 225 LBS

## 2024-02-20 DIAGNOSIS — Z96.619: ICD-10-CM

## 2024-02-20 DIAGNOSIS — T84.028S: ICD-10-CM

## 2024-02-20 DIAGNOSIS — G56.90 NEUROPATHY, AXILLARY NERVE: ICD-10-CM

## 2024-02-20 PROCEDURE — 73030 X-RAY EXAM OF SHOULDER: CPT | Mod: RT

## 2024-02-20 PROCEDURE — 1157F ADVNC CARE PLAN IN RCRD: CPT | Performed by: ORTHOPAEDIC SURGERY

## 2024-02-20 PROCEDURE — 99024 POSTOP FOLLOW-UP VISIT: CPT | Performed by: ORTHOPAEDIC SURGERY

## 2024-02-20 PROCEDURE — 73030 X-RAY EXAM OF SHOULDER: CPT | Mod: RIGHT SIDE | Performed by: RADIOLOGY

## 2024-02-20 PROCEDURE — 1159F MED LIST DOCD IN RCRD: CPT | Performed by: ORTHOPAEDIC SURGERY

## 2024-02-20 PROCEDURE — 1125F AMNT PAIN NOTED PAIN PRSNT: CPT | Performed by: ORTHOPAEDIC SURGERY

## 2024-02-20 PROCEDURE — 1036F TOBACCO NON-USER: CPT | Performed by: ORTHOPAEDIC SURGERY

## 2024-02-20 PROCEDURE — 1111F DSCHRG MED/CURRENT MED MERGE: CPT | Performed by: ORTHOPAEDIC SURGERY

## 2024-02-20 ASSESSMENT — PAIN - FUNCTIONAL ASSESSMENT: PAIN_FUNCTIONAL_ASSESSMENT: 0-10

## 2024-02-20 ASSESSMENT — PAIN DESCRIPTION - DESCRIPTORS: DESCRIPTORS: PRESSURE

## 2024-02-20 ASSESSMENT — PAIN SCALES - GENERAL: PAINLEVEL_OUTOF10: 4

## 2024-02-20 NOTE — PROGRESS NOTES
Subjective    Patient ID: Cam Hernandez is a 74 y.o. male.    Chief Complaint: No chief complaint on file.     Last Surgery: 1. Right Shoulder Total Reverse Replacement Revision; Explant Spacer   Last Surgery Date: 09/27/23    CAM HERNANDEZ is a pleasant 72 year old male who is accompanied by his wife today. He returns to clinic for a repeat postoperative visit. He is status post right anatomic shoulder revision to spacer placement on 10/5/22 for loosening implant and painful shoulder. He was found to have significant bone loss of glenoid at that time. He has completed a repeat CT scan for pre-operative revision planning.     He reports doing well overall after surgery. He is having some pain in the joint space with active movement, likely due to the spacer. Pain is well managed without use of narcotic pain medication at this time. Over-the-counter pain medication as needed. He is sleeping okay. He denies redness or warmth at incision site. No drainage. Denies any fever, chills, or paresthesia. He has been compliant with restrictions. Doing well with daily home therapy.     10/19/23  Cam returns to the clinic today for his first post operative visit. He is s/p right anatomic shoulder revision to spacer placement on 10/5/22 for loosening implant and painful shoulder.    He explains he has been wearing a sling. He is quite sensitive still. He had some questions regarding what he can and cannot do activity wise today.     10/31/23  Cam returns to the clinic today for his second post operative visit. He is s/p right anatomic shoulder revision to spacer placement on 10/5/22 for loosening implant and painful shoulder.    11/14/23  Cam returns to the clinic today for a third post operative visit. He is s/p right anatomic shoulder revision to spacer placement on 10/5/22 for loosening implant and painful shoulder.    He is here today as his shoulder replacement has unfortunately dislocated. He did try to do his  exercises in hopes that his pain improved but he did not see any relief.     01/30/24  He returns to the clinic today for a repeat follow up visit regarding his right shoulder. At his last visit with Ronda in December 2023 we reached out to our reps to get a bigger custom implant made to help with his instability.     He is here today with some pre operative questions.     02/20/24  Cam returns to the clinic for his first post operative visit. He is 13 days s/p right shoulder total arthroplasty revision to reverse total shoulder done 02/07/24.    He explains he was sitting at his computer all day in a sling when he went to move and his shoulder slid out of place.             Objective   Patient shoulder incision is dry, intact and healing well. Mild swelling. Mild ecchymosis. No erythema or warmth. Nonabsorbable suture remains. Surgical glue intact. Neurovascularly intact distally. Axillary nerve appears to be firing. 5 out of 5 wrist, hand and thumb flexion and extension without pain. Full range of motion of elbow.     Image Results:  XR shoulder right 1 view  Narrative: Interpreted By:  Aliza Borjas,   STUDY:  XR SHOULDER RIGHT 1 VIEW;  2/7/2024 3:08 pm      INDICATION:  Signs/Symptoms:s/p shoulder replacement.      COMPARISON:  01/30/2024      ACCESSION NUMBER(S):  [id number]      ORDERING CLINICIAN:  RONDA BATISTA      FINDINGS:  Placement of revision right reverse total shoulder arthroplasty is  noted. There is some expected air within adjacent soft tissues.      There is no evidence for acute fracture or dislocation.      No lytic or blastic lesions are seen.          Impression: Status post revision right reverse shoulder arthroplasty, with  expected postoperative findings.          MACRO:  None      Signed by: Aliza Borjas 2/7/2024 3:38 PM  Dictation workstation:   [id number]  01/30/24 XX-rays taken today show dislocated reverse tumor prosthesis on the right side  Everything looks well fixed.      X-rays personally ordered and interpreted by me show dislocated reverse shoulder replacement     10/19/23 X-rays ordered and personally interpreted by me show some evidence of subluxation    Assessment/Plan   Encounter Diagnoses:  No diagnosis found.  Patient is 6+ weeks s/p many surgeries on his right shoulder, now with another dislocated reverse replacement despite custom implants being made.     We had a long discussion about his options today. I think we are very close to converting back to a dennise arthroplasty. We are going to order an EMG to ascertain whether or not he has any axillary nerve injury. At this point his axillary sensation intact and it appears he is firing his deltoids. Further workup is needed for that We also talked about getting a second opinion. At this point heavy consideration to a dennise arthroplasty should be made. I am not ruling out other options but I am not sure what revision reverse shoulder replacement I could do given the fact that we changed his head size and got appropriate tension during surgery with the cir clause technique. All questions were answered for the family today. They are very frustrated which is understandable. We talked about getting an answer from the EMG and moving forward. We will see him next week for suture removal. No further x-rays needed.     No orders of the defined types were placed in this encounter.    Follow up once EMG is completed.     Scribe Attestation  By signing my name below, IVioletta Scribe   attest that this documentation has been prepared under the direction and in the presence of Romario Zavala MD.

## 2024-02-23 ENCOUNTER — HOSPITAL ENCOUNTER (OUTPATIENT)
Dept: NEUROLOGY | Facility: HOSPITAL | Age: 74
Discharge: HOME | End: 2024-02-23
Payer: MEDICARE

## 2024-02-23 DIAGNOSIS — G56.90 NEUROPATHY, AXILLARY NERVE: ICD-10-CM

## 2024-02-23 PROCEDURE — 95910 NRV CNDJ TEST 7-8 STUDIES: CPT | Performed by: STUDENT IN AN ORGANIZED HEALTH CARE EDUCATION/TRAINING PROGRAM

## 2024-02-23 PROCEDURE — 95910 NRV CNDJ TEST 7-8 STUDIES: CPT | Mod: GC | Performed by: STUDENT IN AN ORGANIZED HEALTH CARE EDUCATION/TRAINING PROGRAM

## 2024-02-23 PROCEDURE — 95886 MUSC TEST DONE W/N TEST COMP: CPT | Mod: GC | Performed by: STUDENT IN AN ORGANIZED HEALTH CARE EDUCATION/TRAINING PROGRAM

## 2024-02-23 PROCEDURE — 95886 MUSC TEST DONE W/N TEST COMP: CPT | Performed by: STUDENT IN AN ORGANIZED HEALTH CARE EDUCATION/TRAINING PROGRAM

## 2024-02-27 ENCOUNTER — OFFICE VISIT (OUTPATIENT)
Dept: ORTHOPEDIC SURGERY | Facility: HOSPITAL | Age: 74
End: 2024-02-27
Payer: MEDICARE

## 2024-02-27 VITALS — WEIGHT: 225 LBS | BODY MASS INDEX: 35.31 KG/M2 | HEIGHT: 67 IN

## 2024-02-27 DIAGNOSIS — Z96.611 STATUS POST REVERSE TOTAL SHOULDER REPLACEMENT, RIGHT: ICD-10-CM

## 2024-02-27 DIAGNOSIS — Z96.619 DISLOCATION OF PROSTHETIC JOINT OF SHOULDER, INITIAL ENCOUNTER (CMS-HCC): Primary | ICD-10-CM

## 2024-02-27 DIAGNOSIS — T84.028A DISLOCATION OF PROSTHETIC JOINT OF SHOULDER, INITIAL ENCOUNTER (CMS-HCC): Primary | ICD-10-CM

## 2024-02-27 DIAGNOSIS — G56.90 NEUROPATHY, AXILLARY NERVE: ICD-10-CM

## 2024-02-27 PROCEDURE — 1036F TOBACCO NON-USER: CPT | Performed by: NURSE PRACTITIONER

## 2024-02-27 PROCEDURE — 1159F MED LIST DOCD IN RCRD: CPT | Performed by: NURSE PRACTITIONER

## 2024-02-27 PROCEDURE — 99024 POSTOP FOLLOW-UP VISIT: CPT | Performed by: NURSE PRACTITIONER

## 2024-02-27 PROCEDURE — 1160F RVW MEDS BY RX/DR IN RCRD: CPT | Performed by: NURSE PRACTITIONER

## 2024-02-27 PROCEDURE — 1157F ADVNC CARE PLAN IN RCRD: CPT | Performed by: NURSE PRACTITIONER

## 2024-02-27 PROCEDURE — 1125F AMNT PAIN NOTED PAIN PRSNT: CPT | Performed by: NURSE PRACTITIONER

## 2024-02-27 PROCEDURE — 1111F DSCHRG MED/CURRENT MED MERGE: CPT | Performed by: NURSE PRACTITIONER

## 2024-02-27 ASSESSMENT — PAIN - FUNCTIONAL ASSESSMENT: PAIN_FUNCTIONAL_ASSESSMENT: NO/DENIES PAIN

## 2024-02-27 NOTE — PROGRESS NOTES
Discussion/Plan:  Patient is 6+ weeks s/p many surgeries on his right shoulder, now with another dislocated reverse replacement despite custom implants being made. He has completed EMG of right upper extremity. Results reviewed today with patient. His axillary nerve appears to be firing. This will need time to come back fully. In the meantime, we have offered to have Loc seen by one of our shoulder colleagues for a second opinion due to the complexity of his right shoulder. We can help facilitate this appointment as needed. Patient understands that our plan at this point is to give his axillary nerve time to heal prior to reimplantation or revision of his current prosthesis in hopes of a better long term outcome than revision at this time.     We will keep in contact with the patient throughout his recovery process and he is aware to call our office if any changes to his incision or shoulder. Patient and his wife are in agreement with this plan and all of their questions were answered today. Patient plan reviewed with Dr Zavala personally and he is in agreement with the above plan.      Should you have any questions or concerns after your visit today, please do not hesitate to call the office at [phone]. We strive to give you high-quality, patient-centered, collaborative care and I am honored to be a part of your health care team.    ______________________________________________________________  CHIEF COMPLAINT:  POV Right Revision Reverse  DOS: 02/07/24    History of Present Illness:  TAYLOR RAMIREZ is a pleasant 72 year old male who is accompanied by his wife today. He returns to clinic for a repeat postoperative visit. He is status post right anatomic shoulder revision to spacer placement on 10/5/22 for loosening implant and painful shoulder. He was found to have significant bone loss of glenoid at that time. He has completed a repeat CT scan for pre-operative revision planning.      He reports doing well  overall after surgery. He is having some pain in the joint space with active movement, likely due to the spacer. Pain is well managed without use of narcotic pain medication at this time. Over-the-counter pain medication as needed. He is sleeping okay. He denies redness or warmth at incision site. No drainage. Denies any fever, chills, or paresthesia. He has been compliant with restrictions. Doing well with daily home therapy.      10/19/23  Cam returns to the clinic today for his first post operative visit. He is s/p right anatomic shoulder revision to spacer placement on 10/5/22 for loosening implant and painful shoulder.     He explains he has been wearing a sling. He is quite sensitive still. He had some questions regarding what he can and cannot do activity wise today.      10/31/23  Cam returns to the clinic today for his second post operative visit. He is s/p right anatomic shoulder revision to spacer placement on 10/5/22 for loosening implant and painful shoulder.     11/14/23  Cam returns to the clinic today for a third post operative visit. He is s/p right anatomic shoulder revision to spacer placement on 10/5/22 for loosening implant and painful shoulder. He is here today as his shoulder replacement has unfortunately dislocated. He did try to do his exercises in hopes that his pain improved but he did not see any relief.      01/30/24  He returns to the clinic today for a repeat follow up visit regarding his right shoulder. At his last visit with Ronda in December 2023 we reached out to our reps to get a bigger custom implant made to help with his instability. He is here today with some pre operative questions.      02/20/24  Cam returns to the clinic for his first post operative visit. He is 13 days s/p right shoulder total arthroplasty revision to reverse total shoulder done 02/07/24. He explains he was sitting at his computer all day in a sling when he went to move and his shoulder slid  out of place.     2/27/24  TAYLOR HERNANDEZ returns to clinic for his right shoulder. He is 3 weeks S/P Right Shoulder Total Arthroplasty Revision to Reverse Total Shoulder, done 02/07/24. Mr. Hernandez experienced another dislocated reverse replacement despite custom implants being made, with the dislocation event occurring 02/18/24. Sutures removed today. He completed his EMG on Friday, which caused him some discomfort.     Review of Systems:   Review of systems for all 14 systems is negative for complaint except for the above noted findings in the history of present illness.    Family, social, and medical histories are obtained and reviewed.    Diagnoses/Problems:  Right shoulder pain  Right shoulder arthritis    Physical Exam:  Patient is a well-developed well-nourished @SEX@ in no acute distress. Breathes with normal chest rises. Eye exam reveals round pupils. Awake alert and oriented x3.     Examination of right shoulder reveals shoulder incision is dry, intact and healing well. Mild swelling. No ecchymosis. No erythema or warmth. Nonabsorbable suture removed today. Steris placed. Neurovascularly intact distally. Axillary nerve appears to be firing. 5 out of 5 wrist, hand and thumb flexion and extension without pain. Full range of motion of elbow. 5/5  strength.      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 Results personally reviewed with patient and Dr Zavala today.     *While intending to generate a timely document that accurately reflects the content of the visit, no guarantee can be provided that every grammatical or spelling mistake has been or will be identified or corrected. Thank you for your understanding.

## 2024-03-06 DIAGNOSIS — R05.2 SUBACUTE COUGH: Primary | ICD-10-CM

## 2024-03-06 RX ORDER — BENZONATATE 200 MG/1
200 CAPSULE ORAL 3 TIMES DAILY PRN
Qty: 60 CAPSULE | Refills: 0 | Status: SHIPPED | OUTPATIENT
Start: 2024-03-06 | End: 2024-04-05

## 2024-03-30 DIAGNOSIS — I10 ESSENTIAL (PRIMARY) HYPERTENSION: ICD-10-CM

## 2024-04-01 RX ORDER — AMLODIPINE BESYLATE 10 MG/1
TABLET ORAL
Qty: 90 TABLET | Refills: 3 | Status: SHIPPED | OUTPATIENT
Start: 2024-04-01

## 2024-04-05 DIAGNOSIS — I10 ESSENTIAL (PRIMARY) HYPERTENSION: ICD-10-CM

## 2024-04-06 RX ORDER — HYDROCHLOROTHIAZIDE 12.5 MG/1
12.5 TABLET ORAL DAILY
Qty: 90 TABLET | Refills: 3 | Status: SHIPPED | OUTPATIENT
Start: 2024-04-06 | End: 2024-05-16 | Stop reason: WASHOUT

## 2024-05-16 ENCOUNTER — LAB (OUTPATIENT)
Dept: LAB | Facility: LAB | Age: 74
End: 2024-05-16
Payer: MEDICARE

## 2024-05-16 ENCOUNTER — OFFICE VISIT (OUTPATIENT)
Dept: PRIMARY CARE | Facility: CLINIC | Age: 74
End: 2024-05-16
Payer: MEDICARE

## 2024-05-16 VITALS
WEIGHT: 237 LBS | SYSTOLIC BLOOD PRESSURE: 119 MMHG | OXYGEN SATURATION: 90 % | TEMPERATURE: 98.5 F | BODY MASS INDEX: 37.12 KG/M2 | HEART RATE: 56 BPM | DIASTOLIC BLOOD PRESSURE: 69 MMHG

## 2024-05-16 DIAGNOSIS — I10 PRIMARY HYPERTENSION: ICD-10-CM

## 2024-05-16 DIAGNOSIS — Z12.5 SCREENING PSA (PROSTATE SPECIFIC ANTIGEN): ICD-10-CM

## 2024-05-16 DIAGNOSIS — M54.50 ACUTE LOW BACK PAIN, UNSPECIFIED BACK PAIN LATERALITY, UNSPECIFIED WHETHER SCIATICA PRESENT: ICD-10-CM

## 2024-05-16 DIAGNOSIS — I10 PRIMARY HYPERTENSION: Primary | ICD-10-CM

## 2024-05-16 DIAGNOSIS — M25.511 CHRONIC RIGHT SHOULDER PAIN: ICD-10-CM

## 2024-05-16 DIAGNOSIS — M10.9 GOUT, UNSPECIFIED: ICD-10-CM

## 2024-05-16 DIAGNOSIS — E78.2 MIXED HYPERLIPIDEMIA: ICD-10-CM

## 2024-05-16 DIAGNOSIS — G89.29 CHRONIC RIGHT SHOULDER PAIN: ICD-10-CM

## 2024-05-16 DIAGNOSIS — E78.5 HYPERLIPIDEMIA, UNSPECIFIED HYPERLIPIDEMIA TYPE: ICD-10-CM

## 2024-05-16 LAB
ALBUMIN SERPL BCP-MCNC: 4.4 G/DL (ref 3.4–5)
ALP SERPL-CCNC: 97 U/L (ref 33–136)
ALT SERPL W P-5'-P-CCNC: 20 U/L (ref 10–52)
ANION GAP SERPL CALC-SCNC: 14 MMOL/L (ref 10–20)
APPEARANCE UR: CLEAR
AST SERPL W P-5'-P-CCNC: 22 U/L (ref 9–39)
BASOPHILS # BLD AUTO: 0.07 X10*3/UL (ref 0–0.1)
BASOPHILS NFR BLD AUTO: 1.1 %
BILIRUB SERPL-MCNC: 1.2 MG/DL (ref 0–1.2)
BILIRUB UR STRIP.AUTO-MCNC: NEGATIVE MG/DL
BUN SERPL-MCNC: 24 MG/DL (ref 6–23)
CALCIUM SERPL-MCNC: 9.1 MG/DL (ref 8.6–10.6)
CHLORIDE SERPL-SCNC: 103 MMOL/L (ref 98–107)
CHOLEST SERPL-MCNC: 124 MG/DL (ref 0–199)
CHOLESTEROL/HDL RATIO: 2.5
CO2 SERPL-SCNC: 27 MMOL/L (ref 21–32)
COLOR UR: ABNORMAL
CREAT SERPL-MCNC: 1.23 MG/DL (ref 0.5–1.3)
EGFRCR SERPLBLD CKD-EPI 2021: 61 ML/MIN/1.73M*2
EOSINOPHIL # BLD AUTO: 0.31 X10*3/UL (ref 0–0.4)
EOSINOPHIL NFR BLD AUTO: 5 %
ERYTHROCYTE [DISTWIDTH] IN BLOOD BY AUTOMATED COUNT: 13.1 % (ref 11.5–14.5)
GLUCOSE SERPL-MCNC: 109 MG/DL (ref 74–99)
GLUCOSE UR STRIP.AUTO-MCNC: NORMAL MG/DL
HCT VFR BLD AUTO: 46.4 % (ref 41–52)
HDLC SERPL-MCNC: 49 MG/DL
HGB BLD-MCNC: 15.8 G/DL (ref 13.5–17.5)
IMM GRANULOCYTES # BLD AUTO: 0.01 X10*3/UL (ref 0–0.5)
IMM GRANULOCYTES NFR BLD AUTO: 0.2 % (ref 0–0.9)
KETONES UR STRIP.AUTO-MCNC: NEGATIVE MG/DL
LDLC SERPL CALC-MCNC: 55 MG/DL
LEUKOCYTE ESTERASE UR QL STRIP.AUTO: NEGATIVE
LYMPHOCYTES # BLD AUTO: 0.9 X10*3/UL (ref 0.8–3)
LYMPHOCYTES NFR BLD AUTO: 14.5 %
MCH RBC QN AUTO: 30.8 PG (ref 26–34)
MCHC RBC AUTO-ENTMCNC: 34.1 G/DL (ref 32–36)
MCV RBC AUTO: 90 FL (ref 80–100)
MONOCYTES # BLD AUTO: 0.63 X10*3/UL (ref 0.05–0.8)
MONOCYTES NFR BLD AUTO: 10.1 %
MUCOUS THREADS #/AREA URNS AUTO: ABNORMAL /LPF
NEUTROPHILS # BLD AUTO: 4.3 X10*3/UL (ref 1.6–5.5)
NEUTROPHILS NFR BLD AUTO: 69.1 %
NITRITE UR QL STRIP.AUTO: NEGATIVE
NON HDL CHOLESTEROL: 75 MG/DL (ref 0–149)
NRBC BLD-RTO: 0 /100 WBCS (ref 0–0)
PH UR STRIP.AUTO: 5 [PH]
PLATELET # BLD AUTO: 188 X10*3/UL (ref 150–450)
POTASSIUM SERPL-SCNC: 4 MMOL/L (ref 3.5–5.3)
PROT SERPL-MCNC: 7 G/DL (ref 6.4–8.2)
PROT UR STRIP.AUTO-MCNC: NEGATIVE MG/DL
PSA SERPL-MCNC: 1.15 NG/ML
RBC # BLD AUTO: 5.13 X10*6/UL (ref 4.5–5.9)
RBC # UR STRIP.AUTO: ABNORMAL /UL
RBC #/AREA URNS AUTO: ABNORMAL /HPF
SODIUM SERPL-SCNC: 140 MMOL/L (ref 136–145)
SP GR UR STRIP.AUTO: 1.02
TRIGL SERPL-MCNC: 98 MG/DL (ref 0–149)
URATE SERPL-MCNC: 7 MG/DL (ref 4–7.5)
UROBILINOGEN UR STRIP.AUTO-MCNC: NORMAL MG/DL
VLDL: 20 MG/DL (ref 0–40)
WBC # BLD AUTO: 6.2 X10*3/UL (ref 4.4–11.3)
WBC #/AREA URNS AUTO: ABNORMAL /HPF

## 2024-05-16 PROCEDURE — 99214 OFFICE O/P EST MOD 30 MIN: CPT | Performed by: FAMILY MEDICINE

## 2024-05-16 PROCEDURE — 36415 COLL VENOUS BLD VENIPUNCTURE: CPT

## 2024-05-16 PROCEDURE — 1160F RVW MEDS BY RX/DR IN RCRD: CPT | Performed by: FAMILY MEDICINE

## 2024-05-16 PROCEDURE — 84550 ASSAY OF BLOOD/URIC ACID: CPT

## 2024-05-16 PROCEDURE — 85025 COMPLETE CBC W/AUTO DIFF WBC: CPT

## 2024-05-16 PROCEDURE — 80053 COMPREHEN METABOLIC PANEL: CPT

## 2024-05-16 PROCEDURE — 3078F DIAST BP <80 MM HG: CPT | Performed by: FAMILY MEDICINE

## 2024-05-16 PROCEDURE — 80061 LIPID PANEL: CPT

## 2024-05-16 PROCEDURE — 1157F ADVNC CARE PLAN IN RCRD: CPT | Performed by: FAMILY MEDICINE

## 2024-05-16 PROCEDURE — 81001 URINALYSIS AUTO W/SCOPE: CPT

## 2024-05-16 PROCEDURE — G0103 PSA SCREENING: HCPCS

## 2024-05-16 PROCEDURE — 3074F SYST BP LT 130 MM HG: CPT | Performed by: FAMILY MEDICINE

## 2024-05-16 PROCEDURE — 1159F MED LIST DOCD IN RCRD: CPT | Performed by: FAMILY MEDICINE

## 2024-05-16 RX ORDER — ATORVASTATIN CALCIUM 80 MG/1
40 TABLET, FILM COATED ORAL DAILY
Qty: 45 TABLET | Refills: 3 | Status: SHIPPED | OUTPATIENT
Start: 2024-05-16

## 2024-05-16 RX ORDER — HYDROCHLOROTHIAZIDE 25 MG/1
25 TABLET ORAL DAILY
Qty: 90 TABLET | Refills: 3 | Status: SHIPPED | OUTPATIENT
Start: 2024-05-16 | End: 2025-05-11

## 2024-05-16 RX ORDER — ALLOPURINOL 300 MG/1
300 TABLET ORAL 3 TIMES WEEKLY
Qty: 40 TABLET | Refills: 3 | Status: SHIPPED | OUTPATIENT
Start: 2024-05-16

## 2024-05-16 NOTE — PROGRESS NOTES
Subjective   Patient ID: Cam Hernandez is a 74 y.o. male who presents for Follow-up (Pt is here to follow up on HTN, CHOL. Pt states he may be due for an annual medicare exam as well. Pt states his back has been hurting him lately. He took a fall down a stair a couple of weeks ago and injured his back and it has been hurting since. He would also like to know his creatine levels. ).    HPI   Loc was seen today for a routine follow-up of his medications, including those for hypertension, hyperlipidemia.  Medication(s) are being taken and tolerated as prescribed, without concerns, list reconciled today.  His main ongoing issue is that of his right shoulder pain, status post several reverse total replacements, all of which have failed, ball-and-socket have not held in place.  He will have another surgery at some point this year, is currently working through additional opinions.  He does need refills as well as routine labs.  Gout has been stable, as have his blood pressures.  Review of Systems  The full review of systems is negative with the exception of what is noted in HPI    Objective   /69 (BP Location: Left arm, Patient Position: Sitting, BP Cuff Size: Large adult)   Pulse 56   Temp 36.9 °C (98.5 °F) (Temporal)   Wt 108 kg (237 lb)   SpO2 90%   BMI 37.12 kg/m²     Physical Exam  Constitutional/General appearance: alert, oriented, well-appearing, in no distress  Head and face exam is normal  No scleral icterus or conjunctival erythema present  Hearing is reduced  Respiratory effort is normal, no dyspnea noted  Cortical function is normal  Mood, affect, are pleasant, appropriate, and interactive.  Insight is normal  Lungs are clear, cardiac exam reveals a regular rate and rhythm, no significant lower extremity edema present.    Assessment/Plan     Hypertension--- since today's blood pressures are at goal, I have recommended continuing the current treatment regimen, including medication as noted above, as  well as a low salt, low-fat, high-fiber diet.  Exercise is to be continued as able and tolerated.  We will continue to follow the high blood pressure on an every six-month basis, and address additional needs should they arise.    Hyperlipidemia--- since lipid panels are/have been stable, I have recommended continuing the current regimen.  This includes a low fat/high-fiber diet, to include foods rich in natural Omega-3's, such as seafood, nuts, and olives, so long as allergies do not prohibit.  Exercise should be continued as able.  Refills were sent as needed.  We will continue followup on an every six-month basis, and will address further needs/issues should they arise.    Keep up cardiology care   Continue orthopedic follow-up for right shoulder.    Gout has been stable on  allopurinol.  Fasting labs as ordered  Refills updated as needed    Follow-up as scheduled  **Portions of this medical record have been created using voice recognition software and may have minor errors which are inherent in voice recognition systems. It has not been fully edited for typographical or grammatical errors**

## 2024-05-28 ENCOUNTER — TELEPHONE (OUTPATIENT)
Dept: PRIMARY CARE | Facility: CLINIC | Age: 74
End: 2024-05-28
Payer: MEDICARE

## 2024-05-28 NOTE — TELEPHONE ENCOUNTER
Demetrio Morejon MD   to Cam Hernandez         5/16/24 12:55 PM  Lex Monterroso, my orders for the CK and myoglobin for some reason were rejected by the lab.  I don't feel strongly you need to come back for a redraw, but if you'd like to the orders are still there.  Sorry, not sure what happened    Last read by Cam Hernandez at  1:48 PM on 5/17/2024.

## 2024-05-28 NOTE — TELEPHONE ENCOUNTER
Called and spoke with patient. Patient was informed, understanding verbalized. Please hold for response from lab. Patient would like call back.

## 2024-05-28 NOTE — TELEPHONE ENCOUNTER
Patient called office trying to figure out why two of his labs were not done, the CK and Myoglobin.   I called the main lab at [phone] and left a message asking if they could call back to let us know.

## 2024-05-29 NOTE — TELEPHONE ENCOUNTER
I called the core lab at [facility] and spoke to a Supervisor who said she sees no reason why the labs were not collected and they must have simply been missed when they pulled all of the orders.  I called the patient to let him know that he is welcome to come in to our lab or any lab near him to have those labs drawn if he wishes to.  I got the VM and LM.  I also left my direct line if he would like to call me back to discuss.  5.29.24 SUEK

## 2024-05-29 NOTE — TELEPHONE ENCOUNTER
Spoke to patient who is aware and will think about whether or not to have the labs drawn 5.29.24 TLK.

## 2024-05-30 DIAGNOSIS — I10 PRIMARY HYPERTENSION: Primary | ICD-10-CM

## 2024-05-30 RX ORDER — CARVEDILOL 6.25 MG/1
6.25 TABLET ORAL 2 TIMES DAILY
Qty: 180 TABLET | Refills: 3 | Status: SHIPPED | OUTPATIENT
Start: 2024-05-30

## 2024-06-04 DIAGNOSIS — G56.90 NEUROPATHY, AXILLARY NERVE: Primary | ICD-10-CM

## 2024-06-14 ENCOUNTER — LAB (OUTPATIENT)
Dept: LAB | Facility: LAB | Age: 74
End: 2024-06-14
Payer: MEDICARE

## 2024-06-14 DIAGNOSIS — M25.511 CHRONIC RIGHT SHOULDER PAIN: ICD-10-CM

## 2024-06-14 DIAGNOSIS — G89.29 CHRONIC RIGHT SHOULDER PAIN: ICD-10-CM

## 2024-06-14 PROCEDURE — 82550 ASSAY OF CK (CPK): CPT

## 2024-06-14 PROCEDURE — 36415 COLL VENOUS BLD VENIPUNCTURE: CPT

## 2024-06-14 PROCEDURE — 83874 ASSAY OF MYOGLOBIN: CPT

## 2024-06-15 LAB
CK SERPL-CCNC: 136 U/L (ref 0–325)
MYOGLOBIN SERPL-MCNC: 88 UG/L

## 2024-07-23 ENCOUNTER — TELEPHONE (OUTPATIENT)
Dept: PRIMARY CARE | Facility: CLINIC | Age: 74
End: 2024-07-23
Payer: MEDICARE

## 2024-07-23 NOTE — TELEPHONE ENCOUNTER
Patient needs referral to urology for BPH, occasional hematuria    His current urologist moved     He would also like a recommendation for someone near [place] also

## 2024-07-23 NOTE — TELEPHONE ENCOUNTER
Recommend Dr Thad Sommer at [facility].  [phone]  Should not need referral so long as in network

## 2024-08-08 ENCOUNTER — TELEPHONE (OUTPATIENT)
Dept: PRIMARY CARE | Facility: CLINIC | Age: 74
End: 2024-08-08
Payer: MEDICARE

## 2024-08-08 DIAGNOSIS — N52.9 VASCULOGENIC ERECTILE DYSFUNCTION, UNSPECIFIED VASCULOGENIC ERECTILE DYSFUNCTION TYPE: ICD-10-CM

## 2024-08-08 RX ORDER — SILDENAFIL 50 MG/1
TABLET, FILM COATED ORAL
Qty: 30 TABLET | Refills: 0 | Status: SHIPPED | OUTPATIENT
Start: 2024-08-08

## 2024-08-08 NOTE — TELEPHONE ENCOUNTER
Left detailed message that medication was sent to the pharmacy. Advised pt to cb if he has any further questions/concerns.

## 2024-08-08 NOTE — TELEPHONE ENCOUNTER
Patient requesting sildenafil rx refill 50mg for 90 day supply. He said in the message left on refill line that the bottle he has shows quantity 60.     Last rx on file shows it was written for #30, however-this patient's chart is showing that error in retrieving dispense report. When I look in [employer] system-it shows it was dispensed as #60 in April 2023 (which is when last rx was written).     Please advise if okay to change quantity to #60 for 90 day supply and send message back for staff to pend rx.   (To be sent to [facility] [phone])

## 2024-09-10 ENCOUNTER — HOSPITAL ENCOUNTER (OUTPATIENT)
Dept: NEUROLOGY | Facility: HOSPITAL | Age: 74
Discharge: HOME | End: 2024-09-10
Payer: MEDICARE

## 2024-09-10 DIAGNOSIS — G56.90 NEUROPATHY, AXILLARY NERVE: ICD-10-CM

## 2024-09-10 PROCEDURE — 95886 MUSC TEST DONE W/N TEST COMP: CPT | Performed by: PSYCHIATRY & NEUROLOGY

## 2024-09-10 PROCEDURE — 95911 NRV CNDJ TEST 9-10 STUDIES: CPT | Performed by: PSYCHIATRY & NEUROLOGY

## 2024-10-01 ENCOUNTER — APPOINTMENT (OUTPATIENT)
Dept: ORTHOPEDIC SURGERY | Facility: HOSPITAL | Age: 74
End: 2024-10-01
Payer: MEDICARE

## 2024-11-27 ENCOUNTER — HOSPITAL ENCOUNTER (OUTPATIENT)
Dept: RADIOLOGY | Facility: CLINIC | Age: 74
Discharge: HOME | End: 2024-11-27
Payer: MEDICARE

## 2024-11-27 DIAGNOSIS — S43.014D ANTERIOR DISLOCATION OF RIGHT HUMERUS, SUBSEQUENT ENCOUNTER: ICD-10-CM

## 2024-11-27 PROCEDURE — 76377 3D RENDER W/INTRP POSTPROCES: CPT | Mod: RT

## 2024-11-27 PROCEDURE — 73060 X-RAY EXAM OF HUMERUS: CPT | Mod: RT

## 2024-11-27 PROCEDURE — 73060 X-RAY EXAM OF HUMERUS: CPT | Mod: RIGHT SIDE | Performed by: RADIOLOGY

## 2024-11-27 PROCEDURE — 73200 CT UPPER EXTREMITY W/O DYE: CPT | Mod: RT

## 2024-11-27 PROCEDURE — 73200 CT UPPER EXTREMITY W/O DYE: CPT | Mod: RIGHT SIDE | Performed by: RADIOLOGY

## 2024-11-27 PROCEDURE — 76377 3D RENDER W/INTRP POSTPROCES: CPT | Mod: RIGHT SIDE | Performed by: RADIOLOGY

## 2024-12-03 ENCOUNTER — APPOINTMENT (OUTPATIENT)
Dept: RADIOLOGY | Facility: CLINIC | Age: 74
End: 2024-12-03
Payer: MEDICARE

## 2024-12-12 ENCOUNTER — HOSPITAL ENCOUNTER (OUTPATIENT)
Dept: RADIOLOGY | Facility: CLINIC | Age: 74
Discharge: HOME | End: 2024-12-12
Payer: MEDICARE

## 2024-12-12 ENCOUNTER — APPOINTMENT (OUTPATIENT)
Dept: PRIMARY CARE | Facility: CLINIC | Age: 74
End: 2024-12-12
Payer: MEDICARE

## 2024-12-12 ENCOUNTER — LAB (OUTPATIENT)
Dept: LAB | Facility: LAB | Age: 74
End: 2024-12-12
Payer: MEDICARE

## 2024-12-12 VITALS
HEIGHT: 67 IN | HEART RATE: 58 BPM | OXYGEN SATURATION: 94 % | BODY MASS INDEX: 38.14 KG/M2 | TEMPERATURE: 98.2 F | WEIGHT: 243 LBS | SYSTOLIC BLOOD PRESSURE: 112 MMHG | DIASTOLIC BLOOD PRESSURE: 69 MMHG

## 2024-12-12 DIAGNOSIS — M24.49: ICD-10-CM

## 2024-12-12 DIAGNOSIS — Z01.818 PREOPERATIVE CLEARANCE: ICD-10-CM

## 2024-12-12 DIAGNOSIS — S43.014D ANTERIOR DISLOCATION OF RIGHT HUMERUS, SUBSEQUENT ENCOUNTER: ICD-10-CM

## 2024-12-12 DIAGNOSIS — Z00.00 MEDICARE ANNUAL WELLNESS VISIT, SUBSEQUENT: ICD-10-CM

## 2024-12-12 DIAGNOSIS — R73.9 ELEVATED BLOOD SUGAR LEVEL: ICD-10-CM

## 2024-12-12 DIAGNOSIS — E78.2 MIXED HYPERLIPIDEMIA: ICD-10-CM

## 2024-12-12 DIAGNOSIS — I10 PRIMARY HYPERTENSION: Primary | ICD-10-CM

## 2024-12-12 DIAGNOSIS — I10 PRIMARY HYPERTENSION: ICD-10-CM

## 2024-12-12 PROCEDURE — 1159F MED LIST DOCD IN RCRD: CPT | Performed by: FAMILY MEDICINE

## 2024-12-12 PROCEDURE — 1123F ACP DISCUSS/DSCN MKR DOCD: CPT | Performed by: FAMILY MEDICINE

## 2024-12-12 PROCEDURE — 85610 PROTHROMBIN TIME: CPT

## 2024-12-12 PROCEDURE — 3074F SYST BP LT 130 MM HG: CPT | Performed by: FAMILY MEDICINE

## 2024-12-12 PROCEDURE — 3078F DIAST BP <80 MM HG: CPT | Performed by: FAMILY MEDICINE

## 2024-12-12 PROCEDURE — 36415 COLL VENOUS BLD VENIPUNCTURE: CPT

## 2024-12-12 PROCEDURE — 84100 ASSAY OF PHOSPHORUS: CPT

## 2024-12-12 PROCEDURE — 1160F RVW MEDS BY RX/DR IN RCRD: CPT | Performed by: FAMILY MEDICINE

## 2024-12-12 PROCEDURE — 1157F ADVNC CARE PLAN IN RCRD: CPT | Performed by: FAMILY MEDICINE

## 2024-12-12 PROCEDURE — 71046 X-RAY EXAM CHEST 2 VIEWS: CPT

## 2024-12-12 PROCEDURE — 85025 COMPLETE CBC W/AUTO DIFF WBC: CPT

## 2024-12-12 PROCEDURE — 83735 ASSAY OF MAGNESIUM: CPT

## 2024-12-12 PROCEDURE — G0439 PPPS, SUBSEQ VISIT: HCPCS | Performed by: FAMILY MEDICINE

## 2024-12-12 PROCEDURE — 1036F TOBACCO NON-USER: CPT | Performed by: FAMILY MEDICINE

## 2024-12-12 PROCEDURE — 80053 COMPREHEN METABOLIC PANEL: CPT

## 2024-12-12 PROCEDURE — 1170F FXNL STATUS ASSESSED: CPT | Performed by: FAMILY MEDICINE

## 2024-12-12 PROCEDURE — 85730 THROMBOPLASTIN TIME PARTIAL: CPT

## 2024-12-12 PROCEDURE — 99214 OFFICE O/P EST MOD 30 MIN: CPT | Performed by: FAMILY MEDICINE

## 2024-12-12 RX ORDER — BACLOFEN 20 MG
TABLET ORAL
COMMUNITY

## 2024-12-12 ASSESSMENT — ACTIVITIES OF DAILY LIVING (ADL)
GROCERY_SHOPPING: INDEPENDENT
DRESSING: INDEPENDENT
BATHING: INDEPENDENT
MANAGING_FINANCES: INDEPENDENT
DOING_HOUSEWORK: INDEPENDENT
TAKING_MEDICATION: INDEPENDENT

## 2024-12-12 ASSESSMENT — PATIENT HEALTH QUESTIONNAIRE - PHQ9
2. FEELING DOWN, DEPRESSED OR HOPELESS: NOT AT ALL
1. LITTLE INTEREST OR PLEASURE IN DOING THINGS: NOT AT ALL
SUM OF ALL RESPONSES TO PHQ9 QUESTIONS 1 AND 2: 0

## 2024-12-13 LAB
ALBUMIN SERPL BCP-MCNC: 4.6 G/DL (ref 3.4–5)
ALP SERPL-CCNC: 86 U/L (ref 33–136)
ALT SERPL W P-5'-P-CCNC: 20 U/L (ref 10–52)
ANION GAP SERPL CALC-SCNC: 14 MMOL/L (ref 10–20)
APTT PPP: 34 SECONDS (ref 27–38)
AST SERPL W P-5'-P-CCNC: 22 U/L (ref 9–39)
BASOPHILS # BLD AUTO: 0.08 X10*3/UL (ref 0–0.1)
BASOPHILS NFR BLD AUTO: 1.1 %
BILIRUB SERPL-MCNC: 1.1 MG/DL (ref 0–1.2)
BUN SERPL-MCNC: 26 MG/DL (ref 6–23)
CALCIUM SERPL-MCNC: 9.4 MG/DL (ref 8.6–10.6)
CHLORIDE SERPL-SCNC: 103 MMOL/L (ref 98–107)
CO2 SERPL-SCNC: 28 MMOL/L (ref 21–32)
CREAT SERPL-MCNC: 1.38 MG/DL (ref 0.5–1.3)
EGFRCR SERPLBLD CKD-EPI 2021: 53 ML/MIN/1.73M*2
EOSINOPHIL # BLD AUTO: 0.25 X10*3/UL (ref 0–0.4)
EOSINOPHIL NFR BLD AUTO: 3.3 %
ERYTHROCYTE [DISTWIDTH] IN BLOOD BY AUTOMATED COUNT: 13 % (ref 11.5–14.5)
GLUCOSE SERPL-MCNC: 115 MG/DL (ref 74–99)
HCT VFR BLD AUTO: 47.7 % (ref 41–52)
HGB BLD-MCNC: 16 G/DL (ref 13.5–17.5)
IMM GRANULOCYTES # BLD AUTO: 0.02 X10*3/UL (ref 0–0.5)
IMM GRANULOCYTES NFR BLD AUTO: 0.3 % (ref 0–0.9)
INR PPP: 1 (ref 0.9–1.1)
LYMPHOCYTES # BLD AUTO: 1.18 X10*3/UL (ref 0.8–3)
LYMPHOCYTES NFR BLD AUTO: 15.6 %
MAGNESIUM SERPL-MCNC: 2.28 MG/DL (ref 1.6–2.4)
MCH RBC QN AUTO: 31.6 PG (ref 26–34)
MCHC RBC AUTO-ENTMCNC: 33.5 G/DL (ref 32–36)
MCV RBC AUTO: 94 FL (ref 80–100)
MONOCYTES # BLD AUTO: 0.62 X10*3/UL (ref 0.05–0.8)
MONOCYTES NFR BLD AUTO: 8.2 %
NEUTROPHILS # BLD AUTO: 5.4 X10*3/UL (ref 1.6–5.5)
NEUTROPHILS NFR BLD AUTO: 71.5 %
NRBC BLD-RTO: 0 /100 WBCS (ref 0–0)
PHOSPHATE SERPL-MCNC: 4 MG/DL (ref 2.5–4.9)
PLATELET # BLD AUTO: 189 X10*3/UL (ref 150–450)
POTASSIUM SERPL-SCNC: 4.2 MMOL/L (ref 3.5–5.3)
PROT SERPL-MCNC: 7.1 G/DL (ref 6.4–8.2)
PROTHROMBIN TIME: 11.4 SECONDS (ref 9.8–12.8)
RBC # BLD AUTO: 5.06 X10*6/UL (ref 4.5–5.9)
SODIUM SERPL-SCNC: 141 MMOL/L (ref 136–145)
WBC # BLD AUTO: 7.6 X10*3/UL (ref 4.4–11.3)

## 2024-12-19 NOTE — PROGRESS NOTES
"Subjective   Patient ID: Cam Hernandez is a 74 y.o. male who presents for Medicare Annual Wellness Visit Subsequent (Pt presents for annual MWV- ABN was given to pt and signed, pt verbalized understanding. Pt has a form for pre-op that has some tests that need done. ).    HPI   Loc was seen today for follow-up and review of his medications, as well as an annual Medicare wellness review.  Medication(s) are being taken and tolerated as prescribed, without concerns, list reconciled today.  There are no complaints of chest pain, shortness of breath, lower extremity edema, or exertional concerns  He is scheduled for another right shoulder surgery at the [facility] in [place], he needs a chest x-ray and several labs checked.  Fasting labs are otherwise up-to-date.    Medicare wellness review:   Pneumococcal series is up-to-date  Flu vaccine given October 16  He is eligible for Tdap and Shingrix  Colonoscopy is up-to-date as of March 2022  He has had abdominal imaging in the past without AAA.    Review of Systems  The full review of systems is negative with the exception of what is noted in HPI    Objective   /69 (BP Location: Left arm, Patient Position: Sitting, BP Cuff Size: Large adult)   Pulse 58   Temp 36.8 °C (98.2 °F) (Temporal)   Ht 1.702 m (5' 7\")   Wt 110 kg (243 lb)   SpO2 94%   BMI 38.06 kg/m²     Physical Exam  Constitutional/General appearance: alert, oriented, well-appearing, in no distress  Head and face exam is normal  No scleral icterus or conjunctival erythema present  Hearing is grossly normal  Respiratory effort is normal, no dyspnea noted  Cortical function is normal  Mood, affect, are pleasant, appropriate, and interactive.  Insight is normal  Cardiac exam reveals a regular rate and rhythm  Lungs are clear bilaterally.    Trace lower extremity edema present.    Assessment/Plan     Today we will check a chest x-ray, labs as recommended by his orthopedic surgeon in [place], right " shoulder surgery date is January 25.     Continue current medications, including those for hypertension,, gout.  Fasting labs are up-to-date.  See [facility] for Medicare checklist    Follow-up in approximately 6 months  **Portions of this medical record have been created using voice recognition software and may have minor errors which are inherent in voice recognition systems. It has not been fully edited for typographical or grammatical errors**

## 2024-12-20 ENCOUNTER — HOSPITAL ENCOUNTER (OUTPATIENT)
Dept: RADIOLOGY | Facility: CLINIC | Age: 74
Discharge: HOME | End: 2024-12-20
Payer: MEDICARE

## 2024-12-20 DIAGNOSIS — M25.512 PAIN IN LEFT SHOULDER: ICD-10-CM

## 2024-12-20 PROCEDURE — 73030 X-RAY EXAM OF SHOULDER: CPT | Mod: LT

## 2025-01-06 DIAGNOSIS — M10.9 GOUT, UNSPECIFIED: ICD-10-CM

## 2025-01-06 DIAGNOSIS — N52.9 VASCULOGENIC ERECTILE DYSFUNCTION, UNSPECIFIED VASCULOGENIC ERECTILE DYSFUNCTION TYPE: ICD-10-CM

## 2025-01-06 DIAGNOSIS — I10 ESSENTIAL (PRIMARY) HYPERTENSION: ICD-10-CM

## 2025-01-06 RX ORDER — SILDENAFIL 50 MG/1
TABLET, FILM COATED ORAL
Qty: 30 TABLET | Refills: 0 | Status: SHIPPED | OUTPATIENT
Start: 2025-01-06

## 2025-01-06 RX ORDER — AMLODIPINE BESYLATE 10 MG/1
10 TABLET ORAL DAILY
Qty: 90 TABLET | Refills: 1 | Status: SHIPPED | OUTPATIENT
Start: 2025-01-06

## 2025-01-06 RX ORDER — VALSARTAN 320 MG/1
320 TABLET ORAL DAILY
Qty: 90 TABLET | Refills: 1 | Status: SHIPPED | OUTPATIENT
Start: 2025-01-06

## 2025-03-07 DIAGNOSIS — I10 PRIMARY HYPERTENSION: ICD-10-CM

## 2025-03-21 RX ORDER — CARVEDILOL 6.25 MG/1
6.25 TABLET ORAL 2 TIMES DAILY
Qty: 180 TABLET | Refills: 3 | OUTPATIENT
Start: 2025-03-21

## 2025-03-21 NOTE — TELEPHONE ENCOUNTER
Patient is scheduled with Dr. Smart now at [facility] for PCP. Established care on 1/13/25.    Harini Jiménez DO, MSEd  [facility]  Office: [phone]  3/21/2025 2:21 PM

## 2025-03-21 NOTE — TELEPHONE ENCOUNTER
MCM was sent 10 days ago. I just left a voicemail for the pt to call and schedule an apt prior to the refill being approved.  Please refuse for now

## 2025-04-03 ENCOUNTER — HOSPITAL ENCOUNTER (OUTPATIENT)
Dept: RADIOLOGY | Facility: CLINIC | Age: 75
Discharge: HOME | End: 2025-04-03
Payer: MEDICARE

## 2025-04-03 DIAGNOSIS — M25.511 PAIN IN RIGHT SHOULDER: ICD-10-CM

## 2025-04-03 PROCEDURE — 73030 X-RAY EXAM OF SHOULDER: CPT | Mod: RT

## 2025-04-24 ENCOUNTER — PATIENT OUTREACH (OUTPATIENT)
Dept: CARE COORDINATION | Facility: CLINIC | Age: 75
End: 2025-04-24
Payer: MEDICARE

## 2025-08-13 DIAGNOSIS — I10 ESSENTIAL (PRIMARY) HYPERTENSION: ICD-10-CM

## 2025-08-13 RX ORDER — AMLODIPINE BESYLATE 10 MG/1
10 TABLET ORAL DAILY
Qty: 90 TABLET | Refills: 0 | OUTPATIENT
Start: 2025-08-13

## 2025-08-28 ENCOUNTER — OFFICE (OUTPATIENT)
Dept: URBAN - METROPOLITAN AREA CLINIC 26 | Facility: CLINIC | Age: 75
End: 2025-08-28
Payer: MEDICARE

## 2025-08-28 VITALS
WEIGHT: 237 LBS | HEIGHT: 68 IN | SYSTOLIC BLOOD PRESSURE: 172 MMHG | DIASTOLIC BLOOD PRESSURE: 78 MMHG | SYSTOLIC BLOOD PRESSURE: 159 MMHG | HEART RATE: 60 BPM | TEMPERATURE: 97.9 F | DIASTOLIC BLOOD PRESSURE: 72 MMHG

## 2025-08-28 DIAGNOSIS — Z79.82 LONG TERM (CURRENT) USE OF ASPIRIN: ICD-10-CM

## 2025-08-28 DIAGNOSIS — Z80.0 FAMILY HISTORY OF MALIGNANT NEOPLASM OF DIGESTIVE ORGANS: ICD-10-CM

## 2025-08-28 DIAGNOSIS — Z86.0100 PERSONAL HISTORY OF COLON POLYPS, UNSPECIFIED: ICD-10-CM

## 2025-08-28 PROCEDURE — 99203 OFFICE O/P NEW LOW 30 MIN: CPT | Performed by: NURSE PRACTITIONER

## 2025-08-28 RX ORDER — PROMETHAZINE HYDROCHLORIDE 12.5 MG/1
TABLET ORAL
Qty: 3 | Refills: 0 | Status: COMPLETED
Start: 2025-08-28 | End: 2025-09-04

## 2025-08-28 RX ORDER — POLYETHYLENE GLYCOL 3350, SODIUM SULFATE ANHYDROUS, SODIUM BICARBONATE, SODIUM CHLORIDE, POTASSIUM CHLORIDE 236; 22.74; 6.74; 5.86; 2.97 G/4L; G/4L; G/4L; G/4L; G/4L
POWDER, FOR SOLUTION ORAL
Qty: 4000 | Refills: 0 | Status: COMPLETED
Start: 2025-08-28 | End: 2025-09-04

## 2025-09-03 VITALS
HEART RATE: 58 BPM | RESPIRATION RATE: 13 BRPM | RESPIRATION RATE: 15 BRPM | DIASTOLIC BLOOD PRESSURE: 50 MMHG | HEART RATE: 55 BPM | SYSTOLIC BLOOD PRESSURE: 157 MMHG | RESPIRATION RATE: 10 BRPM | DIASTOLIC BLOOD PRESSURE: 55 MMHG | HEART RATE: 64 BPM | HEIGHT: 68 IN | RESPIRATION RATE: 12 BRPM | HEART RATE: 54 BPM | OXYGEN SATURATION: 89 % | HEART RATE: 49 BPM | HEART RATE: 46 BPM | DIASTOLIC BLOOD PRESSURE: 68 MMHG | HEART RATE: 48 BPM | SYSTOLIC BLOOD PRESSURE: 159 MMHG | HEART RATE: 59 BPM | DIASTOLIC BLOOD PRESSURE: 52 MMHG | SYSTOLIC BLOOD PRESSURE: 153 MMHG | SYSTOLIC BLOOD PRESSURE: 137 MMHG | OXYGEN SATURATION: 94 % | OXYGEN SATURATION: 97 % | DIASTOLIC BLOOD PRESSURE: 61 MMHG | DIASTOLIC BLOOD PRESSURE: 54 MMHG | OXYGEN SATURATION: 87 % | DIASTOLIC BLOOD PRESSURE: 42 MMHG | DIASTOLIC BLOOD PRESSURE: 53 MMHG | DIASTOLIC BLOOD PRESSURE: 66 MMHG | SYSTOLIC BLOOD PRESSURE: 154 MMHG | RESPIRATION RATE: 16 BRPM | SYSTOLIC BLOOD PRESSURE: 178 MMHG | RESPIRATION RATE: 14 BRPM | DIASTOLIC BLOOD PRESSURE: 76 MMHG | OXYGEN SATURATION: 95 % | SYSTOLIC BLOOD PRESSURE: 147 MMHG | SYSTOLIC BLOOD PRESSURE: 158 MMHG | OXYGEN SATURATION: 93 % | SYSTOLIC BLOOD PRESSURE: 165 MMHG | HEART RATE: 47 BPM | OXYGEN SATURATION: 96 % | DIASTOLIC BLOOD PRESSURE: 78 MMHG | RESPIRATION RATE: 11 BRPM | SYSTOLIC BLOOD PRESSURE: 160 MMHG | HEART RATE: 53 BPM | DIASTOLIC BLOOD PRESSURE: 67 MMHG | WEIGHT: 225 LBS

## 2025-09-04 ENCOUNTER — AMBULATORY SURGICAL CENTER (OUTPATIENT)
Dept: URBAN - METROPOLITAN AREA SURGERY 12 | Facility: SURGERY | Age: 75
End: 2025-09-04
Payer: MEDICARE

## 2025-09-04 DIAGNOSIS — Z09 ENCOUNTER FOR FOLLOW-UP EXAMINATION AFTER COMPLETED TREATMEN: ICD-10-CM

## 2025-09-04 DIAGNOSIS — K64.8 OTHER HEMORRHOIDS: ICD-10-CM

## 2025-09-04 DIAGNOSIS — Z86.0101 PERSONAL HISTORY OF ADENOMATOUS AND SERRATED COLON POLYPS: ICD-10-CM

## 2025-09-04 DIAGNOSIS — Z80.0 FAMILY HISTORY OF MALIGNANT NEOPLASM OF DIGESTIVE ORGANS: ICD-10-CM

## 2025-09-04 DIAGNOSIS — K63.5 POLYP OF COLON: ICD-10-CM

## 2025-09-04 PROCEDURE — 45385 COLONOSCOPY W/LESION REMOVAL: CPT | Mod: PT | Performed by: INTERNAL MEDICINE

## 2025-09-04 PROCEDURE — 45380 COLONOSCOPY AND BIOPSY: CPT | Mod: 59,PT | Performed by: INTERNAL MEDICINE

## (undated) DEVICE — SUTURE, VICRYL, 3-0, 27 IN, CT-2, UNDYED

## (undated) DEVICE — TIP, SUCTION, YANKAUER, FLEXIBLE

## (undated) DEVICE — SUTURE TAPE, 1.3MM 40IN, BLK/WH, W/TAPER NDL 36.6MM

## (undated) DEVICE — INTERPULSE HANDPIECE SET W/ 10FT SUCTION TUBING

## (undated) DEVICE — BANDAGE, COFLEX, 4 X 5 YDS, FOAM TAN, STERILE, LF

## (undated) DEVICE — KIT, BEACH CHAIR TRIMANO

## (undated) DEVICE — HIGH FLOW TIP FOR INTERPULSE HANDPIECE SET

## (undated) DEVICE — RESERVOIR, WOUND, W/TROCAR, PVC, MEDIUM, 400CC, DAVOL, 1/8 IN, 10FR

## (undated) DEVICE — SUTURE, VICRYL, 0, 36 IN, CT-1, UNDYED

## (undated) DEVICE — SOLUTION, IRRIGATION, USP, SODIUM CHLORIDE 0.9%, 3000 ML

## (undated) DEVICE — Device

## (undated) DEVICE — SUTURE, ETHIBOND XTRA, 5 V-37, GRN/BR, LF

## (undated) DEVICE — HOOD, STERISHIELD T4 SYSTEM

## (undated) DEVICE — SUTURE, PROLENE, 3-0, 18 IN, PS1, BLUE

## (undated) DEVICE — DRAPE, INCISE, ANTIMICROBIAL, IOBAN 2, LARGE, 17 X 23 IN, DISPOSABLE, STERILE

## (undated) DEVICE — GLOVE, SURGICAL, PROTEXIS PI ORTHO, 8.5, PF, LF

## (undated) DEVICE — DRAPE, SHEET, 17 X 23 IN

## (undated) DEVICE — MASK, FACE, TENET, FOAM POSITIONING, DISPOSABLE

## (undated) DEVICE — DRAPE, INSTRUMENT, W/POUCH, STERI DRAPE, 7 X 11 IN, DISPOSABLE, STERILE

## (undated) DEVICE — ADHESIVE, SKIN, LIQUIBAND EXCEED

## (undated) DEVICE — SPONGE, LAP, XRAY DECT, 4IN X 18IN, W/MASTER DMT, STERILE

## (undated) DEVICE — PAD, GROUNDING, ELECTROSURGICAL, W/15 FT CABLE, POLYHESIVE II, ADULT, LF

## (undated) DEVICE — COVER, BACK TABLE, 65 X 90, HVY REINFORCED

## (undated) DEVICE — RETRIEVER, SUTURE, HEWSON

## (undated) DEVICE — IRRIGATION SYSTEM, WOUND, SURGIPHOR, 450ML, STERILE